# Patient Record
Sex: FEMALE | Race: WHITE | Employment: UNEMPLOYED | URBAN - METROPOLITAN AREA
[De-identification: names, ages, dates, MRNs, and addresses within clinical notes are randomized per-mention and may not be internally consistent; named-entity substitution may affect disease eponyms.]

---
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
## 2025-04-12 SDOH — HEALTH STABILITY: PHYSICAL HEALTH: ON AVERAGE, HOW MANY DAYS PER WEEK DO YOU ENGAGE IN MODERATE TO STRENUOUS EXERCISE (LIKE A BRISK WALK)?: 3 DAYS

## 2025-04-12 SDOH — HEALTH STABILITY: PHYSICAL HEALTH: ON AVERAGE, HOW MANY MINUTES DO YOU ENGAGE IN EXERCISE AT THIS LEVEL?: 30 MIN

## 2025-04-12 ASSESSMENT — SOCIAL DETERMINANTS OF HEALTH (SDOH): HOW OFTEN DO YOU GET TOGETHER WITH FRIENDS OR RELATIVES?: MORE THAN THREE TIMES A WEEK

## 2025-04-17 ENCOUNTER — OFFICE VISIT (OUTPATIENT)
Dept: FAMILY MEDICINE | Facility: CLINIC | Age: 20
End: 2025-04-17
Payer: COMMERCIAL

## 2025-04-17 VITALS
DIASTOLIC BLOOD PRESSURE: 78 MMHG | TEMPERATURE: 98.6 F | SYSTOLIC BLOOD PRESSURE: 120 MMHG | HEART RATE: 95 BPM | RESPIRATION RATE: 20 BRPM | WEIGHT: 190 LBS | BODY MASS INDEX: 29.82 KG/M2 | OXYGEN SATURATION: 100 % | HEIGHT: 67 IN

## 2025-04-17 DIAGNOSIS — Z00.00 ROUTINE GENERAL MEDICAL EXAMINATION AT A HEALTH CARE FACILITY: Primary | ICD-10-CM

## 2025-04-17 DIAGNOSIS — N92.0 MENORRHAGIA WITH REGULAR CYCLE: ICD-10-CM

## 2025-04-17 LAB
ANION GAP SERPL CALCULATED.3IONS-SCNC: 14 MMOL/L (ref 7–15)
BUN SERPL-MCNC: 10 MG/DL (ref 6–20)
CALCIUM SERPL-MCNC: 9.7 MG/DL (ref 8.8–10.4)
CHLORIDE SERPL-SCNC: 102 MMOL/L (ref 98–107)
CLUE CELLS: ABNORMAL
CREAT SERPL-MCNC: 0.9 MG/DL (ref 0.51–0.95)
EGFRCR SERPLBLD CKD-EPI 2021: >90 ML/MIN/1.73M2
ERYTHROCYTE [DISTWIDTH] IN BLOOD BY AUTOMATED COUNT: 13.1 % (ref 10–15)
GLUCOSE SERPL-MCNC: 79 MG/DL (ref 70–99)
HCG UR QL: NEGATIVE
HCO3 SERPL-SCNC: 26 MMOL/L (ref 22–29)
HCT VFR BLD AUTO: 42.8 % (ref 35–47)
HGB BLD-MCNC: 14 G/DL (ref 11.7–15.7)
MCH RBC QN AUTO: 28.2 PG (ref 26.5–33)
MCHC RBC AUTO-ENTMCNC: 32.7 G/DL (ref 31.5–36.5)
MCV RBC AUTO: 86 FL (ref 78–100)
PLATELET # BLD AUTO: 316 10E3/UL (ref 150–450)
POTASSIUM SERPL-SCNC: 4.1 MMOL/L (ref 3.4–5.3)
RBC # BLD AUTO: 4.96 10E6/UL (ref 3.8–5.2)
SODIUM SERPL-SCNC: 142 MMOL/L (ref 135–145)
TRICHOMONAS, WET PREP: ABNORMAL
TSH SERPL DL<=0.005 MIU/L-ACNC: 1.74 UIU/ML (ref 0.5–4.3)
WBC # BLD AUTO: 9.3 10E3/UL (ref 4–11)
WBC'S/HIGH POWER FIELD, WET PREP: ABNORMAL
YEAST, WET PREP: ABNORMAL

## 2025-04-17 PROCEDURE — 1126F AMNT PAIN NOTED NONE PRSNT: CPT | Performed by: NURSE PRACTITIONER

## 2025-04-17 PROCEDURE — 3078F DIAST BP <80 MM HG: CPT | Performed by: NURSE PRACTITIONER

## 2025-04-17 PROCEDURE — 84443 ASSAY THYROID STIM HORMONE: CPT | Performed by: NURSE PRACTITIONER

## 2025-04-17 PROCEDURE — 80048 BASIC METABOLIC PNL TOTAL CA: CPT | Performed by: NURSE PRACTITIONER

## 2025-04-17 PROCEDURE — 85027 COMPLETE CBC AUTOMATED: CPT | Performed by: NURSE PRACTITIONER

## 2025-04-17 PROCEDURE — 3074F SYST BP LT 130 MM HG: CPT | Performed by: NURSE PRACTITIONER

## 2025-04-17 PROCEDURE — 99385 PREV VISIT NEW AGE 18-39: CPT | Performed by: NURSE PRACTITIONER

## 2025-04-17 PROCEDURE — 81025 URINE PREGNANCY TEST: CPT | Performed by: NURSE PRACTITIONER

## 2025-04-17 PROCEDURE — 87210 SMEAR WET MOUNT SALINE/INK: CPT | Performed by: NURSE PRACTITIONER

## 2025-04-17 PROCEDURE — 36415 COLL VENOUS BLD VENIPUNCTURE: CPT | Performed by: NURSE PRACTITIONER

## 2025-04-17 PROCEDURE — 99173 VISUAL ACUITY SCREEN: CPT | Mod: 59 | Performed by: NURSE PRACTITIONER

## 2025-04-17 PROCEDURE — 99213 OFFICE O/P EST LOW 20 MIN: CPT | Mod: 25 | Performed by: NURSE PRACTITIONER

## 2025-04-17 RX ORDER — TRAZODONE HYDROCHLORIDE 50 MG/1
50 TABLET ORAL
COMMUNITY
Start: 2024-05-13

## 2025-04-17 RX ORDER — GABAPENTIN 100 MG/1
CAPSULE ORAL
COMMUNITY
Start: 2024-11-19

## 2025-04-17 RX ORDER — VENLAFAXINE HYDROCHLORIDE 75 MG/1
75 CAPSULE, EXTENDED RELEASE ORAL
COMMUNITY
Start: 2025-02-20

## 2025-04-17 ASSESSMENT — PAIN SCALES - GENERAL: PAINLEVEL_OUTOF10: NO PAIN (0)

## 2025-04-17 NOTE — PROGRESS NOTES
"Preventive Care Visit  [facility]  Fátima Tam, TAYLOR CNP, Family Medicine  Apr 17, 2025      Assessment & Plan   Problem List Items Addressed This Visit    None  Visit Diagnoses       Routine general medical examination at a health care facility    -  Primary    Relevant Orders    CBC with platelets (Completed)    Basic metabolic panel  (Ca, Cl, CO2, Creat, Gluc, K, Na, BUN) (Completed)    TSH with free T4 reflex (Completed)    Menorrhagia with regular cycle        Relevant Orders    HCG Qual, Urine ([id number]) (Completed)    Wet prep - lab collect (Completed)             Patient has been advised of split billing requirements and indicates understanding: Yes       BMI  Estimated body mass index is 30.21 kg/m  as calculated from the following:    Height as of this encounter: 1.689 m (5' 6.5\").    Weight as of this encounter: 86.2 kg (190 lb).   Weight management plan: Discussed healthy diet and exercise guidelines    Counseling  Appropriate preventive services were addressed with this patient via screening, questionnaire, or discussion as appropriate for fall prevention, nutrition, physical activity, Tobacco-use cessation, social engagement, weight loss and cognition.  Checklist reviewing preventive services available has been given to the patient.        Peyman Maciel is a 19 year old, presenting for the following:  Physical        4/17/2025     9:49 AM   Additional Questions   Roomed by Teri        Via the Health Maintenance questionnaire, the patient has reported the following services have been completed -Chlamydia: [facility] 2025-03-11, this information has been sent to the abstraction team.    HPI  Patient presents for annual physical.   2 months late for period  Start date of last menstrual - 2/12  Want to discuss birth control  Mom is on nexplanon - interest in nexplanon  Never been on any birth control  Ok for pregnancy test  Patient had symptoms like this in the past where " she has 1 month without period. This time it has been 2 months.   Denied other associated symptoms.   Menorrhagia during menstruation.     Anxiety - stable with therapist and psych, on gabapentin  Depression - managing by therapist and psych         Advance Care Planning            4/12/2025   General Health   How would you rate your overall physical health? Good   Feel stress (tense, anxious, or unable to sleep) Rather much   (!) STRESS CONCERN      4/12/2025   Nutrition   Three or more servings of calcium each day? Yes   Diet: Regular (no restrictions)   How many servings of fruit and vegetables per day? (!) 2-3   How many sweetened beverages each day? 0-1         4/12/2025   Exercise   Days per week of moderate/strenous exercise 3 days   Average minutes spent exercising at this level 30 min         4/12/2025   Social Factors   Frequency of gathering with friends or relatives More than three times a week   Worry food won't last until get money to buy more No   Food not last or not have enough money for food? No   Do you have housing? (Housing is defined as stable permanent housing and does not include staying ouside in a car, in a tent, in an abandoned building, in an overnight shelter, or couch-surfing.) No   Are you worried about losing your housing? No   Lack of transportation? No   Unable to get utilities (heat,electricity)? No   Want help with housing or utility concern? No   (!) HOUSING CONCERN PRESENT      4/12/2025   Dental   Dentist two times every year? Yes         Today's PHQ-2 Score:       4/17/2025     9:45 AM   PHQ-2 ( 1999 [employer])   Q1: Little interest or pleasure in doing things 1   Q2: Feeling down, depressed or hopeless 1   PHQ-2 Score 2    Q1: Little interest or pleasure in doing things Several days   Q2: Feeling down, depressed or hopeless Several days   PHQ-2 Score 2       Patient-reported           4/12/2025   Substance Use   Alcohol more than 3/day or more than 7/wk No   Do you use any other  substances recreationally? No     Social History     Tobacco Use    Smoking status: Never    Smokeless tobacco: Never   Vaping Use    Vaping status: Every Day    Substances: Nicotine    Devices: Disposable             4/12/2025   One time HIV Screening   Previous HIV test? No         4/12/2025   STI Screening   New sexual partner(s) since last STI/HIV test? (!) YES      History of abnormal Pap smear: No - under age 21, PAP not appropriate for age             4/12/2025   Contraception/Family Planning   Questions about contraception or family planning (!) YES         Reviewed and updated as needed this visit by Provider                    BP Readings from Last 3 Encounters:   04/17/25 120/78   10/13/19 105/65 (44%, Z = -0.15 /  57%, Z = 0.18)*     *BP percentiles are based on the 2017 AAP Clinical Practice Guideline for girls    Wt Readings from Last 3 Encounters:   04/17/25 86.2 kg (190 lb) (96%, Z= 1.78)*     * Growth percentiles are based on [facility] (Girls, 2-20 Years) data.                  There is no problem list on file for this patient.    No past surgical history on file.    Social History     Tobacco Use    Smoking status: Never    Smokeless tobacco: Never   Substance Use Topics    Alcohol use: Not on file     No family history on file.      Current Outpatient Medications   Medication Sig Dispense Refill    gabapentin (NEURONTIN) 100 MG capsule TAKE 1 TO 2 CAPSULES BY MOUTH TWICE DAILY FOR ANXIETY      traZODone (DESYREL) 50 MG tablet Take 50 mg by mouth.      venlafaxine (EFFEXOR XR) 75 MG 24 hr capsule Take 75 mg by mouth.       Allergies   Allergen Reactions    Dairy Aid [Tilactase]     Pseudoephedrine Hives    Milk (Cow) Rash     Recent Labs   Lab Test 10/13/19  1746   ALT 17   CR 0.53   GFRESTIMATED GFR not calculated, patient <18 years old.   GFRESTBLACK GFR not calculated, patient <18 years old.   POTASSIUM 5.3   TSH 1.18          Review of Systems  Constitutional, HEENT, cardiovascular, pulmonary, gi and  "gu systems are negative, except as otherwise noted.     Objective    Exam  /78 (BP Location: Right arm)   Pulse 95   Temp 98.6  F (37  C) (Tympanic)   Resp 20   Ht 1.689 m (5' 6.5\")   Wt 86.2 kg (190 lb)   LMP 01/15/2025   SpO2 100%   BMI 30.21 kg/m     Estimated body mass index is 30.21 kg/m  as calculated from the following:    Height as of this encounter: 1.689 m (5' 6.5\").    Weight as of this encounter: 86.2 kg (190 lb).    Physical Exam  Constitutional:       Appearance: Normal appearance. She is normal weight.   HENT:      Head: Normocephalic.      Right Ear: Tympanic membrane normal.      Left Ear: Tympanic membrane normal.      Nose: Nose normal.      Mouth/Throat:      Mouth: Mucous membranes are moist.      Pharynx: Oropharynx is clear.   Eyes:      Pupils: Pupils are equal, round, and reactive to light.   Cardiovascular:      Rate and Rhythm: Normal rate and regular rhythm.      Pulses: Normal pulses.      Heart sounds: Normal heart sounds.   Pulmonary:      Effort: Pulmonary effort is normal.      Breath sounds: Normal breath sounds.   Abdominal:      General: Abdomen is flat. Bowel sounds are normal.      Palpations: Abdomen is soft.   Musculoskeletal:         General: Normal range of motion.      Cervical back: Normal range of motion.   Skin:     General: Skin is warm and dry.   Neurological:      General: No focal deficit present.      Mental Status: She is alert and oriented to person, place, and time. Mental status is at baseline.   Psychiatric:         Mood and Affect: Mood normal.         Behavior: Behavior normal.         Thought Content: Thought content normal.         Judgment: Judgment normal.           Vision Screen  Vision Screen Details  Does the patient have corrective lenses (glasses/contacts)?: Yes  Vision Acuity Screen  Vision Acuity Tool: Dion  RIGHT EYE: 10/10 (20/20)  LEFT EYE: 10/10 (20/20)    Hearing Screen  Hearing Screen Not Completed  Reason Hearing Screen was " not completed: Parent declined - No concerns        Signed Electronically by: TAYLOR Holley CNP

## 2025-04-17 NOTE — PATIENT INSTRUCTIONS
Patient Education   Preventive Care Advice   This is general advice given by our system to help you stay healthy. However, your care team may have specific advice just for you. Please talk to your care team about your preventive care needs.  Nutrition  Eat 5 or more servings of fruits and vegetables each day.  Try wheat bread, brown rice and whole grain pasta (instead of white bread, rice, and pasta).  Get enough calcium and vitamin D. Check the label on foods and aim for 100% of the RDA (recommended daily allowance).  Lifestyle  Exercise at least 150 minutes each week  (30 minutes a day, 5 days a week).  Do muscle strengthening activities 2 days a week. These help control your weight and prevent disease.  No smoking.  Wear sunscreen to prevent skin cancer.  Have a dental exam and cleaning every 6 months.  Yearly exams  See your health care team every year to talk about:  Any changes in your health.  Any medicines your care team has prescribed.  Preventive care, family planning, and ways to prevent chronic diseases.  Shots (vaccines)   HPV shots (up to age 26), if you've never had them before.  Hepatitis B shots (up to age 59), if you've never had them before.  COVID-19 shot: Get this shot when it's due.  Flu shot: Get a flu shot every year.  Tetanus shot: Get a tetanus shot every 10 years.  Pneumococcal, hepatitis A, and RSV shots: Ask your care team if you need these based on your risk.  Shingles shot (for age 50 and up)  General health tests  Diabetes screening:  Starting at age 35, Get screened for diabetes at least every 3 years.  If you are younger than age 35, ask your care team if you should be screened for diabetes.  Cholesterol test: At age 39, start having a cholesterol test every 5 years, or more often if advised.  Bone density scan (DEXA): At age 50, ask your care team if you should have this scan for osteoporosis (brittle bones).  Hepatitis C: Get tested at least once in your life.  STIs (sexually  transmitted infections)  Before age 24: Ask your care team if you should be screened for STIs.  After age 24: Get screened for STIs if you're at risk. You are at risk for STIs (including HIV) if:  You are sexually active with more than one person.  You don't use condoms every time.  You or a partner was diagnosed with a sexually transmitted infection.  If you are at risk for HIV, ask about PrEP medicine to prevent HIV.  Get tested for HIV at least once in your life, whether you are at risk for HIV or not.  Cancer screening tests  Cervical cancer screening: If you have a cervix, begin getting regular cervical cancer screening tests starting at age 21.  Breast cancer scan (mammogram): If you've ever had breasts, begin having regular mammograms starting at age 40. This is a scan to check for breast cancer.  Colon cancer screening: It is important to start screening for colon cancer at age 45.  Have a colonoscopy test every 10 years (or more often if you're at risk) Or, ask your provider about stool tests like a FIT test every year or Cologuard test every 3 years.  To learn more about your testing options, visit:   .  For help making a decision, visit:   https://bit.ly/cd69961.  Prostate cancer screening test: If you have a prostate, ask your care team if a prostate cancer screening test (PSA) at age 55 is right for you.  Lung cancer screening: If you are a current or former smoker ages 50 to 80, ask your care team if ongoing lung cancer screenings are right for you.  For informational purposes only. Not to replace the advice of your health care provider. Copyright   2023 [facility]. All rights reserved. Clinically reviewed by the [facility]. [employer] 184255 - REV 01/24.  Learning About Stress  What is stress?     Stress is your body's response to a hard situation. Your body can have a physical, emotional, or mental response. Stress is a fact of life for most people, and it  affects everyone differently. What causes stress for you may not be stressful for someone else.  A lot of things can cause stress. You may feel stress when you go on a job interview, take a test, or run a race. This kind of short-term stress is normal and even useful. It can help you if you need to work hard or react quickly. For example, stress can help you finish an important job on time.  Long-term stress is caused by ongoing stressful situations or events. Examples of long-term stress include long-term health problems, ongoing problems at work, or conflicts in your family. Long-term stress can harm your health.  How does stress affect your health?  When you are stressed, your body responds as though you are in danger. It makes hormones that speed up your heart, make you breathe faster, and give you a burst of energy. This is called the fight-or-flight stress response. If the stress is over quickly, your body goes back to normal and no harm is done.  But if stress happens too often or lasts too long, it can have bad effects. Long-term stress can make you more likely to get sick, and it can make symptoms of some diseases worse. If you tense up when you are stressed, you may develop neck, shoulder, or low back pain. Stress is linked to high blood pressure and heart disease.  Stress also harms your emotional health. It can make you pack, tense, or depressed. Your relationships may suffer, and you may not do well at work or school.  What can you do to manage stress?  You can try these things to help manage stress:   Do something active. Exercise or activity can help reduce stress. Walking is a great way to get started. Even everyday activities such as housecleaning or yard work can help.  Try yoga or pamela chi. These techniques combine exercise and meditation. You may need some training at first to learn them.  Do something you enjoy. For example, listen to music or go to a movie. Practice your hobby or do volunteer  "work.  Meditate. This can help you relax, because you are not worrying about what happened before or what may happen in the future.  Do guided imagery. Imagine yourself in any setting that helps you feel calm. You can use online videos, books, or a teacher to guide you.  Do breathing exercises. For example:  From a standing position, bend forward from the waist with your knees slightly bent. Let your arms dangle close to the floor.  Breathe in slowly and deeply as you return to a standing position. Roll up slowly and lift your head last.  Hold your breath for just a few seconds in the standing position.  Breathe out slowly and bend forward from the waist.  Let your feelings out. Talk, laugh, cry, and express anger when you need to. Talking with supportive friends or family, a counselor, or a rosibel leader about your feelings is a healthy way to relieve stress. Avoid discussing your feelings with people who make you feel worse.  Write. It may help to write about things that are bothering you. This helps you find out how much stress you feel and what is causing it. When you know this, you can find better ways to cope.  What can you do to prevent stress?  You might try some of these things to help prevent stress:  Manage your time. This helps you find time to do the things you want and need to do.  Get enough sleep. Your body recovers from the stresses of the day while you are sleeping.  Get support. Your family, friends, and community can make a difference in how you experience stress.  Limit your news feed. Avoid or limit time on social media or news that may make you feel stressed.  Do something active. Exercise or activity can help reduce stress. Walking is a great way to get started.  Where can you learn more?  Go to https://www.[employer].net/patiented  Enter N032 in the search box to learn more about \"Learning About Stress.\"  Current as of: October 24, 2024  Content Version: 14.4 2024-2025 [employer].   Care instructions adapted under license by your healthcare professional. If you have questions about a medical condition or this instruction, always ask your healthcare professional. [employer] disclaims any warranty or liability for your use of this information.    Safer Sex: Care Instructions  Overview  Safer sex is a way to reduce your risk of getting a sexually transmitted infection (STI). It can also help prevent pregnancy.  Several products can help you practice safer sex and reduce your chance of STIs. One of the best is a condom. There are internal and external condoms. You can use a special rubber sheet (dental dam) for protection during oral sex. Disposable gloves can keep your hands from touching blood, semen, or other body fluids that can carry infections.  Remember that birth control methods such as diaphragms, IUDs, foams, and birth control pills do not stop you from getting STIs.  Follow-up care is a key part of your treatment and safety. Be sure to make and go to all appointments, and call your doctor if you are having problems. It's also a good idea to know your test results and keep a list of the medicines you take.  How can you care for yourself at home?  Think about getting vaccinated to help prevent hepatitis A, hepatitis B, and human papillomavirus (HPV). They can be spread through sex.  Use a condom every time you have sex. Use an external condom, which goes on the penis. Or use an internal condom, which goes into the vagina or anus.  Make sure you use the right size external condom. A condom that's too small can break easily. A condom that's too big can slip off during sex.  Use a new condom each time you have sex. Be careful not to poke a hole in the condom when you open the wrapper.  Don't use an internal condom and an external condom at the same time.  Never use petroleum jelly (such as Vaseline), grease, hand lotion, baby oil, or anything with oil in it. These products  "can make holes in the condom.  After intercourse, hold the edge of the condom as you remove it. This will help keep semen from spilling out of the condom.  Do not have sex with anyone who has symptoms of an STI, such as sores on the genitals or mouth.  Do not drink a lot of alcohol or use drugs before sex.  Limit your sex partners. Sex with one partner who has sex only with you can reduce your risk of getting an STI.  Don't share sex toys. But if you do share them, use a condom and clean the sex toys between each use.  Talk to any partners before you have sex. Talk about what you feel comfortable with and whether you have any boundaries with sex. And find out if your partner or partners may be at risk for any STI. Keep in mind that a person may be able to spread an STI even if they do not have symptoms. You and any partners may want to get tested for STIs.  Where can you learn more?  Go to https://www.[employer].net/patiented  Enter B608 in the search box to learn more about \"Safer Sex: Care Instructions.\"  Current as of: April 30, 2024  Content Version: 14.4    7314-8485 [employer].   Care instructions adapted under license by your healthcare professional. If you have questions about a medical condition or this instruction, always ask your healthcare professional. [employer] disclaims any warranty or liability for your use of this information.       "

## 2025-05-06 ENCOUNTER — OFFICE VISIT (OUTPATIENT)
Dept: FAMILY MEDICINE | Facility: CLINIC | Age: 20
End: 2025-05-06
Payer: COMMERCIAL

## 2025-05-06 VITALS
OXYGEN SATURATION: 100 % | BODY MASS INDEX: 29.89 KG/M2 | SYSTOLIC BLOOD PRESSURE: 128 MMHG | HEART RATE: 101 BPM | RESPIRATION RATE: 20 BRPM | DIASTOLIC BLOOD PRESSURE: 78 MMHG | TEMPERATURE: 98.7 F | WEIGHT: 186 LBS | HEIGHT: 66 IN

## 2025-05-06 DIAGNOSIS — Z30.017 ENCOUNTER FOR INITIAL PRESCRIPTION OF IMPLANTABLE SUBDERMAL CONTRACEPTIVE: Primary | ICD-10-CM

## 2025-05-06 DIAGNOSIS — Z30.017 NEXPLANON INSERTION: ICD-10-CM

## 2025-05-06 LAB — HCG UR QL: NEGATIVE

## 2025-05-06 PROCEDURE — 81025 URINE PREGNANCY TEST: CPT | Performed by: NURSE PRACTITIONER

## 2025-05-06 PROCEDURE — 11981 INSERTION DRUG DLVR IMPLANT: CPT | Performed by: NURSE PRACTITIONER

## 2025-05-06 ASSESSMENT — PAIN SCALES - GENERAL: PAINLEVEL_OUTOF10: NO PAIN (0)

## 2025-05-06 NOTE — PROGRESS NOTES
"Nexplanon Insertion:    Is a pregnancy test required: No.  Last pregnancy test for 17 negative has had her period and   Was a consent obtained?  Yes    Subjective: Janell Bradley is a 19 year old No obstetric history on file. presents for Nexplanon.    Patient has been given the opportunity to ask questions about all forms of birth control, including all options appropriate for Janell Bradley. Discussed that no method of birth control, except abstinence is 100% effective against pregnancy or sexually transmitted infection.     Janell Bradley understands she may have the Nexplanon removed at any time and it should be removed by a health care provider.    The entire insertion procedure was reviewed with the patient, including care after placement.    Patient's last menstrual period was 04/16/2025. Last sexual activity: 5/1/2025 . No allergy to betadine or shellfish. Recent STD screening  hCG Urine Qualitative   Date Value Ref Range Status   04/17/2025 Negative Negative Final     Comment:     This test is for screening purposes.  Results should be interpreted along with the clinical picture.  Confirmation testing is available if warranted by ordering [id number], HCG Quantitative Pregnancy.         /78   Pulse 101   Temp 98.7  F (37.1  C) (Tympanic)   Resp 20   Ht 1.676 m (5' 6\")   Wt 84.4 kg (186 lb)   LMP 04/16/2025   SpO2 100%   BMI 30.02 kg/m      PROCEDURE NOTE: -- Nexplanon Insertion    Reason for Insertion: contraception    Patient was placed supine with left arm exposed.  Buffy was made 8-10 cm above medial epicondyle and a guiding buffy 4 cm above the first.  Arm was prepped with Betadine. Insertion point was anesthetized with 2 mL 1% lidocaine. After stretching the skin with thumb and index finger around the insertion site, skin punctured with the tip of the needle inserted at 30 degrees and then lowered to horizontal position. The needle was then advanced to its full length. Applicator was then " stabilized and slider was unlocked. Slider was pulled back until it stopped and then removed.    Correct placement of the implant was confirmed by palpation in the patient's arm and visualizing the purple top of the obturator.   Bandage and pressure dressing applied to insertion site.    EBL: minimal    Complications: none    ASSESSMENT:     ICD-10-CM    1. Screening for HIV (human immunodeficiency virus)  Z11.4       2. Need for hepatitis C screening test  Z11.59              PLAN:    Given 's handouts, including when to have Nexplanon removed, list of danger s/sx, side effects and follow up recommended. Encouraged condom use for prevention of STD. Back up contraception advised for 7 days. Advised to call for any fever, for prolonged or severe pain or bleeding, abnormal vaginal dischage. She was advised to use pain medications (ibuprofen) as needed for mild to moderate pain.     TAYLOR Holley CNP

## 2025-05-14 ENCOUNTER — OFFICE VISIT (OUTPATIENT)
Dept: URGENT CARE | Facility: URGENT CARE | Age: 20
End: 2025-05-14
Payer: COMMERCIAL

## 2025-05-14 VITALS
BODY MASS INDEX: 29.38 KG/M2 | OXYGEN SATURATION: 99 % | WEIGHT: 182 LBS | TEMPERATURE: 99.4 F | SYSTOLIC BLOOD PRESSURE: 121 MMHG | DIASTOLIC BLOOD PRESSURE: 80 MMHG | RESPIRATION RATE: 16 BRPM | HEART RATE: 95 BPM

## 2025-05-14 DIAGNOSIS — R07.0 THROAT PAIN: ICD-10-CM

## 2025-05-14 DIAGNOSIS — B27.99 INFECTIOUS MONONUCLEOSIS, WITH OTHER COMPLICATION, INFECTIOUS MONONUCLEOSIS DUE TO UNSPECIFIED ORGANISM: Primary | ICD-10-CM

## 2025-05-14 DIAGNOSIS — G43.909 MIGRAINE WITHOUT STATUS MIGRAINOSUS, NOT INTRACTABLE, UNSPECIFIED MIGRAINE TYPE: ICD-10-CM

## 2025-05-14 DIAGNOSIS — R22.1 NECK MASS: ICD-10-CM

## 2025-05-14 LAB
BASOPHILS # BLD AUTO: 0 10E3/UL (ref 0–0.2)
BASOPHILS NFR BLD AUTO: 1 %
DEPRECATED S PYO AG THROAT QL EIA: NEGATIVE
EOSINOPHIL # BLD AUTO: 0.1 10E3/UL (ref 0–0.7)
EOSINOPHIL NFR BLD AUTO: 1 %
ERYTHROCYTE [DISTWIDTH] IN BLOOD BY AUTOMATED COUNT: 13.6 % (ref 10–15)
HCT VFR BLD AUTO: 39.7 % (ref 35–47)
HGB BLD-MCNC: 13.5 G/DL (ref 11.7–15.7)
IMM GRANULOCYTES # BLD: 0 10E3/UL
IMM GRANULOCYTES NFR BLD: 1 %
LYMPHOCYTES # BLD AUTO: 1.7 10E3/UL (ref 0.8–5.3)
LYMPHOCYTES NFR BLD AUTO: 26 %
MCH RBC QN AUTO: 29 PG (ref 26.5–33)
MCHC RBC AUTO-ENTMCNC: 34 G/DL (ref 31.5–36.5)
MCV RBC AUTO: 85 FL (ref 78–100)
MONOCYTES # BLD AUTO: 0.6 10E3/UL (ref 0–1.3)
MONOCYTES NFR BLD AUTO: 9 %
MONOCYTES NFR BLD AUTO: POSITIVE %
NEUTROPHILS # BLD AUTO: 4.2 10E3/UL (ref 1.6–8.3)
NEUTROPHILS NFR BLD AUTO: 64 %
NRBC # BLD AUTO: 0 10E3/UL
NRBC BLD AUTO-RTO: 0 /100
PLAT MORPH BLD: ABNORMAL
PLATELET # BLD AUTO: 215 10E3/UL (ref 150–450)
RBC # BLD AUTO: 4.66 10E6/UL (ref 3.8–5.2)
RBC MORPH BLD: ABNORMAL
S PYO DNA THROAT QL NAA+PROBE: NOT DETECTED
VARIANT LYMPHS BLD QL SMEAR: PRESENT
WBC # BLD AUTO: 6.6 10E3/UL (ref 4–11)

## 2025-05-14 PROCEDURE — 3079F DIAST BP 80-89 MM HG: CPT | Performed by: NURSE PRACTITIONER

## 2025-05-14 PROCEDURE — 99215 OFFICE O/P EST HI 40 MIN: CPT | Performed by: NURSE PRACTITIONER

## 2025-05-14 PROCEDURE — 36415 COLL VENOUS BLD VENIPUNCTURE: CPT | Performed by: NURSE PRACTITIONER

## 2025-05-14 PROCEDURE — 85025 COMPLETE CBC W/AUTO DIFF WBC: CPT | Performed by: NURSE PRACTITIONER

## 2025-05-14 PROCEDURE — 86308 HETEROPHILE ANTIBODY SCREEN: CPT | Performed by: NURSE PRACTITIONER

## 2025-05-14 PROCEDURE — 87651 STREP A DNA AMP PROBE: CPT | Performed by: NURSE PRACTITIONER

## 2025-05-14 PROCEDURE — 3074F SYST BP LT 130 MM HG: CPT | Performed by: NURSE PRACTITIONER

## 2025-05-14 RX ORDER — SUMATRIPTAN 50 MG/1
50 TABLET, FILM COATED ORAL
Qty: 6 TABLET | Refills: 0 | Status: SHIPPED | OUTPATIENT
Start: 2025-05-14

## 2025-05-14 NOTE — PROGRESS NOTES
SUBJECTIVE:   Janell Bradley is a 19 year old female presenting with a chief complaint of   Chief Complaint   Patient presents with    Throat Pain     Swollen glands and vomiting (possibly from migraines)   Threw up once yesterday and once today.  Bumps on neck noticed yesterday.  Neck hurts to move, and if pressed on.  Pt tried ibuprofen -helped the migraine but not the neck pain.  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  No cough, no runny nose or stuffy nose.      No past medical history on file.  No family history on file.  Current Outpatient Medications   Medication Sig Dispense Refill    gabapentin (NEURONTIN) 100 MG capsule TAKE 1 TO 2 CAPSULES BY MOUTH TWICE DAILY FOR ANXIETY      traZODone (DESYREL) 50 MG tablet Take 50 mg by mouth.      venlafaxine (EFFEXOR XR) 75 MG 24 hr capsule Take 75 mg by mouth.       Social History     Tobacco Use    Smoking status: Never    Smokeless tobacco: Never   Substance Use Topics    Alcohol use: Not on file       OBJECTIVE  /80   Pulse 95   Temp 99.4  F (37.4  C) (Tympanic)   Resp 16   Wt 82.6 kg (182 lb)   LMP 04/16/2025   SpO2 99%   BMI 29.38 kg/m      Physical Exam  Constitutional:       Appearance: Normal appearance.   HENT:      Right Ear: Tympanic membrane, ear canal and external ear normal.      Left Ear: Tympanic membrane, ear canal and external ear normal.      Nose: Nose normal.      Mouth/Throat:      Mouth: Mucous membranes are moist.      Pharynx: Posterior oropharyngeal erythema present.   Eyes:      Extraocular Movements: Extraocular movements intact.      Conjunctiva/sclera: Conjunctivae normal.   Neck:        Comments: Approx 5cm x 3cm firm mobile mass to right anterior neck. Tender to palpation.   Cardiovascular:      Rate and Rhythm: Normal rate and regular rhythm.      Heart sounds: Normal  heart sounds.   Pulmonary:      Effort: Pulmonary effort is normal.      Breath sounds: Normal breath sounds.   Musculoskeletal:      Cervical back: Tenderness present.   Lymphadenopathy:      Cervical: Cervical adenopathy present.   Skin:     General: Skin is warm and dry.   Neurological:      Mental Status: She is alert.   Psychiatric:         Mood and Affect: Mood normal.     Labs reviewed with pt and dad  Recent Results (from the past week)   Streptococcus A Rapid Screen w/Reflex to PCR - Clinic Collect    Specimen: Throat; Swab   Result Value Ref Range Status    Group A Strep antigen Negative Negative Final   Group A Streptococcus PCR Throat Swab    Specimen: Throat; Swab   Result Value Ref Range Status    Group A strep by PCR Not Detected Not Detected Final   Mononucleosis screen   Result Value Ref Range Status    Mononucleosis Screen Positive (A) Negative Final   CBC with platelets and differential   Result Value Ref Range Status    WBC Count 6.6 4.0 - 11.0 10e3/uL Final    RBC Count 4.66 3.80 - 5.20 10e6/uL Final    Hemoglobin 13.5 11.7 - 15.7 g/dL Final    Hematocrit 39.7 35.0 - 47.0 % Final    MCV 85 78 - 100 fL Final    MCH 29.0 26.5 - 33.0 pg Final    MCHC 34.0 31.5 - 36.5 g/dL Final    RDW 13.6 10.0 - 15.0 % Final    Platelet Count 215 150 - 450 10e3/uL Final    % Neutrophils 64 % Final    % Lymphocytes 26 % Final    % Monocytes 9 % Final    % Eosinophils 1 % Final    % Basophils 1 % Final    % Immature Granulocytes 1 % Final    NRBCs per 100 WBC 0 <1 /100 Final    Absolute Neutrophils 4.2 1.6 - 8.3 10e3/uL Final    Absolute Lymphocytes 1.7 0.8 - 5.3 10e3/uL Final    Absolute Monocytes 0.6 0.0 - 1.3 10e3/uL Final    Absolute Eosinophils 0.1 0.0 - 0.7 10e3/uL Final    Absolute Basophils 0.0 0.0 - 0.2 10e3/uL Final    Absolute Immature Granulocytes 0.0 <=0.4 10e3/uL Final    Absolute NRBCs 0.0 10e3/uL Final     *Note: Due to a large number of results and/or encounters for the requested time period, some  results have not been displayed. A complete set of results can be found in Results Review.     I consulted with Dr Schwab on the mass on pt neck. He assessed the pt as well. I suspect this is a very large lymph node but would need imaging to confirm this. It is not uncommon with dx of mono but somewhat odd that it is only the one side. Pt should have CT scan to rule out other causes for the mass.  Pt will go to [facility] tomorrow at 9am for further evaluation.    ASSESSMENT:    1. Infectious mononucleosis, with other complication, infectious mononucleosis due to unspecified organism (Primary)      2. Neck mass      3. Throat pain    - Streptococcus A Rapid Screen w/Reflex to PCR - Clinic Collect  - Group A Streptococcus PCR Throat Swab  - CBC with platelets and differential; Future  - Mononucleosis screen; Future  - CBC with platelets and differential  - Mononucleosis screen    4. Migraine without status migrainosus, not intractable, unspecified migraine type    - SUMAtriptan (IMITREX) 50 MG tablet; Take 1 tablet (50 mg) by mouth at onset of headache for migraine. May repeat in 2 hours. Max 4 tablets/24 hours.  Dispense: 6 tablet; Refill: 0      PLAN:  Referral to Acute and Diagnostic Services    [phone] ([place]) [address] [address]    Transition to Acute & Diagnostic Services Clinic has been discussed with patient, and she agrees with next level of care.   Patient understands that evaluation/treatment at [facility] typically takes significantly longer than in clinic/urgent care (>2 hours).  The [facility] has been contacted by provider/staff to confirm patient acceptance.         Special issues:      None                   The following provider has assessed this patient for intervention at [facility], and directed the patient for referral: Silke Hollins NP

## 2025-05-14 NOTE — PATIENT INSTRUCTIONS
Things to Know About Infectious Mononucleosis  What is infectious mononucleosis?  Infectious mononucleosis (say: in-feck-shuss mon-oh-new-gvdk-rq-ndgq), or mono, often is caused by the Dez-Barr virus. Mono usually is not serious, but some people with mono feel very tired and have pain in their joints that lasts for several weeks.  Who gets mono and why?  Mono is most common in older children, teenagers, and young adults. The virus is spread by contact with the saliva of someone who had the infection within the past few months. Mono can be spread by kissing a person who is infected or by sharing a glass, bottle, or eating utensils.  Most people get mono by the time they are adults. But most people have a very mild infection that might be mistaken for the flu.  How can my doctor tell I have mono?  People with mono usually have a sore throat, fever, swollen glands, and pus on their tonsils. Their liver and spleen might be tender and larger than normal. Your doctor might want you to have blood tests. He or she might want to test you for other sicknesses that are like mono.  How is mono treated?  The most important thing you can do when you have mono is get plenty of rest and drink enough liquids. You may want to take a pain reliever such as acetaminophen (one brand name: Tylenol) or ibuprofen (some brand names: Advil, Motrin, Nuprin). Do not give aspirin to children with mono. If your throat is very sore or if your tonsils are swollen, your doctor might prescribe medicines called corticosteroids.  Because mono is caused by a virus, antibiotics would not help you get better. But some people with mono get strep throat at the same time. Antibiotics will help strep throat go away.  What can I expect?  Most people with mono feel better after one month. Some people feel tired and sleep more than normal for as long as six months.  If you have a job, it is a good idea to let your human resources department know that you are  sick with mono. If you are not able to work for a long time, you might be able to take a medical leave of absence.  If you are a student, talk to your guidance counselor. He or she will help you decide what to do if you will be out of school for a long time.  Is mono ever dangerous?  Sometimes mono can cause serious problems. One problem is with the spleen. The spleen is an organ in the upper part of your abdomen on the left side. In people with mono, sometimes the spleen grows very large and tears open. This is called a rupture. This happens to only about one in 1,000 people with mono. About one half of these ruptures happen during contact sports, such as football. If you get mono, you should not play sports for at least four weeks. Your doctor might want you to have an ultrasound test before you go back to sports.  Mono also can affect your liver. If you have mono, you should not drink alcohol while you are sick. If you notice a yellow color to your skin or if you begin to bruise easily, see your doctor.  How can I prevent mono?  The best way to keep from getting mono is to avoid contact with the saliva of infected people. Do not share bottles, cans, glasses, plates, or eating utensils. Do not kiss a person who had mono recently.

## 2025-05-15 ENCOUNTER — OFFICE VISIT (OUTPATIENT)
Dept: PEDIATRICS | Facility: CLINIC | Age: 20
End: 2025-05-15
Payer: COMMERCIAL

## 2025-05-15 ENCOUNTER — RESULTS FOLLOW-UP (OUTPATIENT)
Dept: PEDIATRICS | Facility: CLINIC | Age: 20
End: 2025-05-15

## 2025-05-15 VITALS
BODY MASS INDEX: 29.25 KG/M2 | TEMPERATURE: 98.3 F | RESPIRATION RATE: 16 BRPM | DIASTOLIC BLOOD PRESSURE: 79 MMHG | SYSTOLIC BLOOD PRESSURE: 113 MMHG | WEIGHT: 182 LBS | HEART RATE: 101 BPM | OXYGEN SATURATION: 97 % | HEIGHT: 66 IN

## 2025-05-15 DIAGNOSIS — B27.99 INFECTIOUS MONONUCLEOSIS, WITH OTHER COMPLICATION, INFECTIOUS MONONUCLEOSIS DUE TO UNSPECIFIED ORGANISM: ICD-10-CM

## 2025-05-15 DIAGNOSIS — R22.1 NECK MASS: Primary | ICD-10-CM

## 2025-05-15 ASSESSMENT — PAIN SCALES - GENERAL: PAINLEVEL_OUTOF10: MODERATE PAIN (6)

## 2025-05-15 NOTE — LETTER
May 15, 2025      Janell Bradley  [address] [facility] [address]        To Whom It May Concern:    Janell Bradley was seen on 5/14/2025 and 5/15/2025.  Please excuse her from 5/14/2025 until 5/18/2025 due to illness.        Sincerely,        Jud Decker DNP    Electronically signed

## 2025-05-15 NOTE — PATIENT INSTRUCTIONS
CT scan negative for any abscess or infection, multiple enlarged lymph nodes as expected with mono    Can apply ice and/or heat to area, continue ibuprofen and Tylenol alternating and drink plenty of fluids    If swallowing symptoms or throat pain worsens, please follow-up with primary care provider or urgent care    If neck swelling not improving, would recommend following up with ENT, referral placed.

## 2025-05-15 NOTE — PROGRESS NOTES
Acute [facility] Diagnostic Services Clinic Visit    Assessment & Plan     Neck mass  Infectious mononucleosis, with other complication, infectious mononucleosis due to unspecified organism  Patient presents to [facility] today after evaluation in urgent care yesterday for neck pain, headache, nausea vomiting and throat pain.  Tested positive for mono.  Was found to have a large, tender right neck mass.  Suspected to be a large lymph node given Mono positive, however given unilateral and size, further evaluation advised.  Patient reports mass is similar in size from yesterday, not associated with any warmth, erythema or surrounding swelling.  Throat symptoms the same with erythema, worse on the right.  Able to swallow okay, eating mashed potatoes here during visit.  Patient is currently afebrile, however has been alternating Tylenol and ibuprofen.  Neck CT obtained to further evaluate mass, showing enlarged adenoid tonsil and palate teen tonsil and multiple enlarged lymph nodes, the largest at the right level llb, most consistent with patient swelling.  No abscess noted and mild soft tissue edema in the right submandibular region.  Discussed results with patient and significant other.  Recommend continued supportive cares including rest, hydration, alternating Tylenol and ibuprofen and applying heat and/or ice to neck area.  Advised if symptoms worsen to return to urgent care or contact primary care provider.  - CT Soft Tissue Neck w Contrast; Future  - sodium chloride (PF) 0.9% PF flush 3 mL  - Adult ENT  Referral; Future    40 minutes were spent doing chart review, history and exam, documentation and further activities per the note.           Peyman Maciel is a 19 year old, presenting for the following health issues:  Throat Pain (Mass right side of neck/throat causing pain, currently positive for mononucleosis. Throat pain was worse last night and had trouble breathing because of the pain. Seemed to have gotten  "bigger than earlier in the day. This morning it is the same size as it was yesterday in . /)    HPI    Chief Complaint   Patient presents with    Throat Pain     Mass right side of neck/throat causing pain, currently positive for mononucleosis. Throat pain was worse last night and had trouble breathing because of the pain. Seemed to have gotten bigger than earlier in the day. This morning it is the same size as it was yesterday in .          Mass on neck is back down to where it was prior to going into urgent care, was larger after poking/prodding.   Taking Tylenol and ibuprofen around the clock since last evening  Drinking a lot of fluids  Difficulty swallowing still  Hasn't been eating           Review of Systems  Constitutional, HEENT, cardiovascular, pulmonary, gi and gu systems are negative, except as otherwise noted.      Objective    /79 (BP Location: Left arm, Patient Position: Sitting, Cuff Size: Adult Regular)   Pulse 101   Temp 98.3  F (36.8  C) (Tympanic)   Resp 16   Ht 1.676 m (5' 6\")   Wt 82.6 kg (182 lb)   LMP 04/16/2025   SpO2 97%   BMI 29.38 kg/m    Body mass index is 29.38 kg/m .  Physical Exam   GENERAL: alert, no distress, and fatigued  HENT: normal cephalic/atraumatic, ear canals and TM's normal, oral mucous membranes moist, and posterior oropharynx erythema R>L  NECK: approximate 5cm x 3cm firm, mobile tender mass on right anterior neck without surrounding erythema, swelling or warmth   RESP: lungs clear to auscultation - no rales, rhonchi or wheezes  CV: regular rate and rhythm, normal S1 S2, no S3 or S4, no murmur, click or rub, no peripheral edema  ABDOMEN: soft, nontender, no hepatosplenomegaly, no masses and bowel sounds normal  MS: no gross musculoskeletal defects noted, no edema  SKIN: no suspicious lesions or rashes  NEURO: Normal strength and tone, mentation intact and speech normal  PSYCH: mentation appears normal, affect normal/bright    Diagnostic Test " Results:  Labs reviewed in Epic  CT soft tissue neck ~  IMPRESSION:   1.  Enlarged adenoid tonsil and bilateral palatine tonsils. Multiple enhancing bilateral cervical and parapharyngeal lymph nodes. Some of them are enlarged and some of them are borderline enlarged. The largest is in the right level IIb and measures 1.9   cm. These are favored to be secondary to infectious mononucleosis.  2.  Mild subcutaneous soft tissue edema in the right submandibular region.         Signed Electronically by: Jud Decker DNP      Chart documentation with Dragon Voice recognition Software. Although reviewed after completion, some words and grammatical errors may remain.

## 2025-05-19 ENCOUNTER — PATIENT OUTREACH (OUTPATIENT)
Dept: CARE COORDINATION | Facility: CLINIC | Age: 20
End: 2025-05-19
Payer: COMMERCIAL

## 2025-05-19 ENCOUNTER — TELEPHONE (OUTPATIENT)
Dept: FAMILY MEDICINE | Facility: CLINIC | Age: 20
End: 2025-05-19
Payer: COMMERCIAL

## 2025-05-19 ENCOUNTER — HOSPITAL ENCOUNTER (EMERGENCY)
Facility: CLINIC | Age: 20
Discharge: HOME | End: 2025-05-19
Attending: FAMILY MEDICINE | Admitting: FAMILY MEDICINE
Payer: COMMERCIAL

## 2025-05-19 VITALS
HEART RATE: 94 BPM | RESPIRATION RATE: 20 BRPM | BODY MASS INDEX: 28.56 KG/M2 | DIASTOLIC BLOOD PRESSURE: 79 MMHG | SYSTOLIC BLOOD PRESSURE: 116 MMHG | OXYGEN SATURATION: 98 % | TEMPERATURE: 97.8 F | WEIGHT: 182 LBS | HEIGHT: 67 IN

## 2025-05-19 DIAGNOSIS — R59.0 CERVICAL LYMPHADENOPATHY: ICD-10-CM

## 2025-05-19 DIAGNOSIS — B27.90 MONONUCLEOSIS SYNDROME: ICD-10-CM

## 2025-05-19 PROCEDURE — 99283 EMERGENCY DEPT VISIT LOW MDM: CPT | Performed by: FAMILY MEDICINE

## 2025-05-19 PROCEDURE — 250N000012 HC RX MED GY IP 250 OP 636 PS 637: Performed by: FAMILY MEDICINE

## 2025-05-19 RX ADMIN — DEXAMETHASONE 10 MG: 2 TABLET ORAL at 11:32

## 2025-05-19 ASSESSMENT — ACTIVITIES OF DAILY LIVING (ADL): ADLS_ACUITY_SCORE: 41

## 2025-05-19 NOTE — ED PROVIDER NOTES
"  History     Chief Complaint   Patient presents with    Pharyngitis     HPI  Janell Bradley is a 19 year old female who presents with a history of recently diagnosed mononucleosis with prior CT in the last week demonstrating only adenopathy and no abscess.  She returns here for larger left neck swelling is more localized and tender and no associated shortness of breath wheezing stridor there is no nausea vomiting she is tolerating food fluids.  She has typical fatigue and sleeping more per day related to her mono.        Allergies:  Allergies   Allergen Reactions    Other Drug Allergy (See Comments) Anaphylaxis     hay    Dairy Aid [Tilactase]     Pseudoephedrine Hives    Milk (Cow) Rash       Problem List:    There are no active problems to display for this patient.       Past Medical History:    No past medical history on file.    Past Surgical History:    No past surgical history on file.    Family History:    No family history on file.    Social History:  Marital Status:  Single [1]  Social History     Tobacco Use    Smoking status: Never    Smokeless tobacco: Never   Vaping Use    Vaping status: Every Day    Substances: Nicotine    Devices: Disposable        Medications:    gabapentin (NEURONTIN) 100 MG capsule  SUMAtriptan (IMITREX) 50 MG tablet  traZODone (DESYREL) 50 MG tablet  venlafaxine (EFFEXOR XR) 75 MG 24 hr capsule          Review of Systems  ROS:  5 point ROS negative except as noted above in HPI, including Gen., Resp., CV, GI &  system review.      Physical Exam   BP: 116/79  Pulse: 94  Temp: 97.8  F (36.6  C)  Resp: 20  Height: 170.2 cm (5' 7\")  Weight: 82.6 kg (182 lb)  SpO2: 99 %      Physical Exam  Constitutional:       General: She is in acute distress.      Appearance: She is not diaphoretic.   Eyes:      Conjunctiva/sclera: Conjunctivae normal.   Cardiovascular:      Rate and Rhythm: Normal rate and regular rhythm.      Heart sounds: No murmur heard.  Pulmonary:      Effort: Pulmonary " effort is normal. No respiratory distress.      Breath sounds: Normal breath sounds. No stridor. No wheezing or rhonchi.   Musculoskeletal:      Cervical back: Neck supple.   Lymphadenopathy:      Cervical: Cervical adenopathy present.   Skin:     Coloration: Skin is not pale.      Findings: No rash.   Neurological:      Mental Status: She is alert.           There is large tender adenopathy cervical particularly on the right side.  This appears to be a very large single node.  No signs of abscess airway is clear there is no stridor or wheezing.  Neck is supple.  She is nontoxic in appearance and afebrile    ED Course        Procedures              Critical Care time:  none     None         No results found for this or any previous visit (from the past 24 hours).    Medications   dexAMETHasone (DECADRON) tablet 10 mg (has no administration in time range)       Assessments & Plan (with Medical Decision Making)     MDM;Janell Bradley is a 19 year old female presents with a mononucleosis history in the last week with CT imaging earlier demonstrating adenopathy and she now has significant cervical adenopathy on the left side that is isolated firm and more consistent with a cervical lymph node that is with mononucleosis.  She has no airway compromise no signs of abscess no toxicity and I would recommend Decadron today 10 mg orally and discharged home note for work and follow-up with primary provider.  Precautions are given for return.  Discussed maintaining hydration  I have reviewed the nursing notes.    I have reviewed the findings, diagnosis, plan and need for follow up with the patient.           Medical Decision Making  The patient's presentation was of moderate complexity (a chronic illness mild to moderate exacerbation, progression, or side effect of treatment).    The patient's evaluation involved:  history and exam without other MDM data elements    The patient's management necessitated moderate risk  (prescription drug management including medications given in the ED).        New Prescriptions    No medications on file       Final diagnoses:   Mononucleosis syndrome   Cervical lymphadenopathy - large right node consiostent with mono.  no signs of abscess. reassuring recent CT.  plan decadron here - which will work for 60 hours.  continue home hydration. tylenol and motrin as needed.         5/19/2025   [facility] EMERGENCY DEPT       Orlando Magaña MD  05/19/25 1685

## 2025-05-19 NOTE — DISCHARGE INSTRUCTIONS
ICD-10-CM    1. Mononucleosis syndrome  B27.90       2. Cervical lymphadenopathy  R59.0     large right node consiostent with mono.  no signs of abscess. reassuring recent CT.  plan decadron here - which will work for 60 hours.  continue home hydration. tylenol and motrin as needed.

## 2025-05-19 NOTE — TELEPHONE ENCOUNTER
Patient was seen in [facility] on 5/15/25 for a neck mass and mononucleosis was given ENT referral she is unable to get in until 6/26/25.    Patient states the swelling has increased, is having difficulty swallowing her saliva and she is having a harder time to breath.     Advised patient she needs to be evaluated in the ED ASAP, she agreed with this plan, [place] and [place] to [place] ED, advised if she is noticing her breathing is becoming labored she needs to call 911.     Update sent to Norma Decker DNP as FYI.    Julie Behrendt RN

## 2025-05-19 NOTE — ED TRIAGE NOTES
"Pt presents to ED with c/o swollen/sore throat. Pt dx with Coke on Wednesday, seen on Thursday and had CT scan d/t increased swelling in throat. Pt states swelling/pain to bilateral throat/neck. Pt states \"feels like rock in back of my throat\". Patient states pain worse with breathing. Afebrile.      Triage Assessment (Adult)       Row Name 05/19/25 1022          Triage Assessment    Airway WDL WDL        Respiratory WDL    Respiratory WDL WDL        Skin Circulation/Temperature WDL    Skin Circulation/Temperature WDL WDL        Cardiac WDL    Cardiac WDL WDL        Cognitive/Neuro/Behavioral WDL    Cognitive/Neuro/Behavioral WDL WDL                     "

## 2025-05-19 NOTE — Clinical Note
Janell Bradley was seen and treated in our emergency department on 5/19/2025.  She may return to work on 05/24/2025.       If you have any questions or concerns, please don't hesitate to call.      Torres Tejada, RN

## 2025-05-19 NOTE — TELEPHONE ENCOUNTER
FYI - Status Update    Who is Calling: patient    Update: Pt seen in clinic 5/15. Pt states she has scheduled an ENT appt for the soonest available 6/26. Pt states she woke up this AM 5/19 with increased neck swelling and states it's harder to breathe. Pt feels frustrated and does not know how to proceed. Please advise.     Does caller want a call/response back: Yes     Could we send this information to you in [employer] or would you prefer to receive a phone call?:   Patient would prefer a phone call   Okay to leave a detailed message?: Yes at Home number on file [phone] (home)

## 2025-05-21 ENCOUNTER — MYC MEDICAL ADVICE (OUTPATIENT)
Dept: FAMILY MEDICINE | Facility: CLINIC | Age: 20
End: 2025-05-21
Payer: COMMERCIAL

## 2025-05-22 ENCOUNTER — OFFICE VISIT (OUTPATIENT)
Dept: FAMILY MEDICINE | Facility: CLINIC | Age: 20
End: 2025-05-22
Payer: COMMERCIAL

## 2025-05-22 ENCOUNTER — RESULTS FOLLOW-UP (OUTPATIENT)
Dept: FAMILY MEDICINE | Facility: CLINIC | Age: 20
End: 2025-05-22

## 2025-05-22 ENCOUNTER — HOSPITAL ENCOUNTER (EMERGENCY)
Facility: CLINIC | Age: 20
Discharge: HOME | End: 2025-05-22
Attending: EMERGENCY MEDICINE | Admitting: EMERGENCY MEDICINE
Payer: COMMERCIAL

## 2025-05-22 ENCOUNTER — APPOINTMENT (OUTPATIENT)
Dept: ULTRASOUND IMAGING | Facility: CLINIC | Age: 20
End: 2025-05-22
Attending: EMERGENCY MEDICINE
Payer: COMMERCIAL

## 2025-05-22 ENCOUNTER — TELEPHONE (OUTPATIENT)
Dept: FAMILY MEDICINE | Facility: CLINIC | Age: 20
End: 2025-05-22

## 2025-05-22 VITALS
OXYGEN SATURATION: 99 % | DIASTOLIC BLOOD PRESSURE: 68 MMHG | WEIGHT: 183 LBS | HEART RATE: 87 BPM | TEMPERATURE: 97.8 F | SYSTOLIC BLOOD PRESSURE: 106 MMHG | BODY MASS INDEX: 28.72 KG/M2 | RESPIRATION RATE: 16 BRPM | HEIGHT: 67 IN

## 2025-05-22 VITALS
HEART RATE: 81 BPM | BODY MASS INDEX: 29.41 KG/M2 | SYSTOLIC BLOOD PRESSURE: 109 MMHG | TEMPERATURE: 98.3 F | OXYGEN SATURATION: 99 % | RESPIRATION RATE: 16 BRPM | DIASTOLIC BLOOD PRESSURE: 67 MMHG | HEIGHT: 66 IN | WEIGHT: 183 LBS

## 2025-05-22 DIAGNOSIS — B27.99 INFECTIOUS MONONUCLEOSIS, WITH OTHER COMPLICATION, INFECTIOUS MONONUCLEOSIS DUE TO UNSPECIFIED ORGANISM: Primary | ICD-10-CM

## 2025-05-22 DIAGNOSIS — R74.8 ELEVATED LIVER ENZYMES: ICD-10-CM

## 2025-05-22 DIAGNOSIS — A49.01 STAPH AUREUS INFECTION: ICD-10-CM

## 2025-05-22 DIAGNOSIS — I88.9 CERVICAL LYMPHADENITIS: ICD-10-CM

## 2025-05-22 LAB
ALBUMIN SERPL BCG-MCNC: 4.1 G/DL (ref 3.5–5.2)
ALBUMIN SERPL BCG-MCNC: 4.2 G/DL (ref 3.5–5.2)
ALBUMIN UR-MCNC: NEGATIVE MG/DL
ALP SERPL-CCNC: 313 U/L (ref 40–150)
ALP SERPL-CCNC: 322 U/L (ref 40–150)
ALT SERPL W P-5'-P-CCNC: 583 U/L (ref 0–50)
ALT SERPL W P-5'-P-CCNC: 586 U/L (ref 0–50)
ANION GAP SERPL CALCULATED.3IONS-SCNC: 13 MMOL/L (ref 7–15)
ANION GAP SERPL CALCULATED.3IONS-SCNC: 14 MMOL/L (ref 7–15)
APAP SERPL-MCNC: <5 UG/ML (ref 10–30)
APPEARANCE UR: CLEAR
AST SERPL W P-5'-P-CCNC: 255 U/L (ref 0–35)
AST SERPL W P-5'-P-CCNC: 256 U/L (ref 0–35)
BASOPHILS # BLD AUTO: 0 10E3/UL (ref 0–0.2)
BASOPHILS NFR BLD AUTO: 0 %
BILIRUB SERPL-MCNC: 0.6 MG/DL
BILIRUB SERPL-MCNC: 0.6 MG/DL
BILIRUB UR QL STRIP: NEGATIVE
BUN SERPL-MCNC: 12.4 MG/DL (ref 6–20)
BUN SERPL-MCNC: 13.3 MG/DL (ref 6–20)
CALCIUM SERPL-MCNC: 9.4 MG/DL (ref 8.8–10.4)
CALCIUM SERPL-MCNC: 9.4 MG/DL (ref 8.8–10.4)
CHLORIDE SERPL-SCNC: 102 MMOL/L (ref 98–107)
CHLORIDE SERPL-SCNC: 104 MMOL/L (ref 98–107)
COLOR UR AUTO: ABNORMAL
CREAT SERPL-MCNC: 0.9 MG/DL (ref 0.51–0.95)
CREAT SERPL-MCNC: 1 MG/DL (ref 0.51–0.95)
EGFRCR SERPLBLD CKD-EPI 2021: 83 ML/MIN/1.73M2
EGFRCR SERPLBLD CKD-EPI 2021: >90 ML/MIN/1.73M2
EOSINOPHIL # BLD AUTO: 0.1 10E3/UL (ref 0–0.7)
EOSINOPHIL NFR BLD AUTO: 1 %
ERYTHROCYTE [DISTWIDTH] IN BLOOD BY AUTOMATED COUNT: 14.6 % (ref 10–15)
ERYTHROCYTE [DISTWIDTH] IN BLOOD BY AUTOMATED COUNT: 14.8 % (ref 10–15)
GLUCOSE SERPL-MCNC: 86 MG/DL (ref 70–99)
GLUCOSE SERPL-MCNC: 92 MG/DL (ref 70–99)
GLUCOSE UR STRIP-MCNC: NEGATIVE MG/DL
HAV AB SER QL IA: REACTIVE
HCG UR QL: NEGATIVE
HCO3 SERPL-SCNC: 23 MMOL/L (ref 22–29)
HCO3 SERPL-SCNC: 25 MMOL/L (ref 22–29)
HCT VFR BLD AUTO: 39.4 % (ref 35–47)
HCT VFR BLD AUTO: 40.6 % (ref 35–47)
HGB BLD-MCNC: 12.8 G/DL (ref 11.7–15.7)
HGB BLD-MCNC: 13.1 G/DL (ref 11.7–15.7)
HGB UR QL STRIP: NEGATIVE
IMM GRANULOCYTES # BLD: 0.1 10E3/UL
IMM GRANULOCYTES NFR BLD: 1 %
INR PPP: 1 (ref 0.85–1.15)
KETONES UR STRIP-MCNC: NEGATIVE MG/DL
LEUKOCYTE ESTERASE UR QL STRIP: NEGATIVE
LYMPHOCYTES # BLD AUTO: 7.9 10E3/UL (ref 0.8–5.3)
LYMPHOCYTES NFR BLD AUTO: 66 %
MCH RBC QN AUTO: 27.9 PG (ref 26.5–33)
MCH RBC QN AUTO: 28 PG (ref 26.5–33)
MCHC RBC AUTO-ENTMCNC: 32.3 G/DL (ref 31.5–36.5)
MCHC RBC AUTO-ENTMCNC: 32.5 G/DL (ref 31.5–36.5)
MCV RBC AUTO: 86 FL (ref 78–100)
MCV RBC AUTO: 87 FL (ref 78–100)
MONOCYTES # BLD AUTO: 0.8 10E3/UL (ref 0–1.3)
MONOCYTES NFR BLD AUTO: 7 %
NEUTROPHILS # BLD AUTO: 3 10E3/UL (ref 1.6–8.3)
NEUTROPHILS NFR BLD AUTO: 25 %
NITRATE UR QL: NEGATIVE
NRBC # BLD AUTO: 0 10E3/UL
NRBC BLD AUTO-RTO: 0 /100
PH UR STRIP: 6.5 [PH] (ref 5–7)
PLAT MORPH BLD: NORMAL
PLATELET # BLD AUTO: 264 10E3/UL (ref 150–450)
PLATELET # BLD AUTO: 280 10E3/UL (ref 150–450)
POTASSIUM SERPL-SCNC: 3.9 MMOL/L (ref 3.4–5.3)
POTASSIUM SERPL-SCNC: 4.4 MMOL/L (ref 3.4–5.3)
PROT SERPL-MCNC: 7.5 G/DL (ref 6.4–8.3)
PROT SERPL-MCNC: 7.5 G/DL (ref 6.4–8.3)
PROTHROMBIN TIME: 13.1 SECONDS (ref 11.8–14.8)
RBC # BLD AUTO: 4.57 10E6/UL (ref 3.8–5.2)
RBC # BLD AUTO: 4.69 10E6/UL (ref 3.8–5.2)
RBC MORPH BLD: NORMAL
RBC URINE: <1 /HPF
SODIUM SERPL-SCNC: 140 MMOL/L (ref 135–145)
SODIUM SERPL-SCNC: 141 MMOL/L (ref 135–145)
SP GR UR STRIP: 1.02 (ref 1–1.03)
SQUAMOUS EPITHELIAL: 4 /HPF
UROBILINOGEN UR STRIP-MCNC: NORMAL MG/DL
WBC # BLD AUTO: 11.4 10E3/UL (ref 4–11)
WBC # BLD AUTO: 11.9 10E3/UL (ref 4–11)
WBC URINE: 1 /HPF

## 2025-05-22 PROCEDURE — 3074F SYST BP LT 130 MM HG: CPT | Performed by: NURSE PRACTITIONER

## 2025-05-22 PROCEDURE — 86705 HEP B CORE ANTIBODY IGM: CPT | Performed by: EMERGENCY MEDICINE

## 2025-05-22 PROCEDURE — 93975 VASCULAR STUDY: CPT

## 2025-05-22 PROCEDURE — 36415 COLL VENOUS BLD VENIPUNCTURE: CPT | Performed by: EMERGENCY MEDICINE

## 2025-05-22 PROCEDURE — 99284 EMERGENCY DEPT VISIT MOD MDM: CPT | Performed by: EMERGENCY MEDICINE

## 2025-05-22 PROCEDURE — 85025 COMPLETE CBC W/AUTO DIFF WBC: CPT | Performed by: NURSE PRACTITIONER

## 2025-05-22 PROCEDURE — 76705 ECHO EXAM OF ABDOMEN: CPT

## 2025-05-22 PROCEDURE — 80143 DRUG ASSAY ACETAMINOPHEN: CPT | Performed by: EMERGENCY MEDICINE

## 2025-05-22 PROCEDURE — 86706 HEP B SURFACE ANTIBODY: CPT | Performed by: EMERGENCY MEDICINE

## 2025-05-22 PROCEDURE — 80053 COMPREHEN METABOLIC PANEL: CPT | Performed by: NURSE PRACTITIONER

## 2025-05-22 PROCEDURE — 1125F AMNT PAIN NOTED PAIN PRSNT: CPT | Performed by: NURSE PRACTITIONER

## 2025-05-22 PROCEDURE — 81025 URINE PREGNANCY TEST: CPT | Performed by: EMERGENCY MEDICINE

## 2025-05-22 PROCEDURE — 86708 HEPATITIS A ANTIBODY: CPT | Performed by: NURSE PRACTITIONER

## 2025-05-22 PROCEDURE — 82947 ASSAY GLUCOSE BLOOD QUANT: CPT | Performed by: EMERGENCY MEDICINE

## 2025-05-22 PROCEDURE — 99284 EMERGENCY DEPT VISIT MOD MDM: CPT | Mod: 25 | Performed by: EMERGENCY MEDICINE

## 2025-05-22 PROCEDURE — 99214 OFFICE O/P EST MOD 30 MIN: CPT | Performed by: NURSE PRACTITIONER

## 2025-05-22 PROCEDURE — 81003 URINALYSIS AUTO W/O SCOPE: CPT | Performed by: EMERGENCY MEDICINE

## 2025-05-22 PROCEDURE — 36415 COLL VENOUS BLD VENIPUNCTURE: CPT | Performed by: NURSE PRACTITIONER

## 2025-05-22 PROCEDURE — 85610 PROTHROMBIN TIME: CPT | Performed by: EMERGENCY MEDICINE

## 2025-05-22 PROCEDURE — 87340 HEPATITIS B SURFACE AG IA: CPT | Performed by: EMERGENCY MEDICINE

## 2025-05-22 PROCEDURE — 85014 HEMATOCRIT: CPT | Performed by: EMERGENCY MEDICINE

## 2025-05-22 PROCEDURE — 3078F DIAST BP <80 MM HG: CPT | Performed by: NURSE PRACTITIONER

## 2025-05-22 RX ORDER — CEPHALEXIN 500 MG/1
500 CAPSULE ORAL 2 TIMES DAILY
Qty: 14 CAPSULE | Refills: 0 | Status: SHIPPED | OUTPATIENT
Start: 2025-05-22 | End: 2025-05-29

## 2025-05-22 RX ORDER — PREDNISONE 20 MG/1
TABLET ORAL
Qty: 14 TABLET | Refills: 0 | Status: SHIPPED | OUTPATIENT
Start: 2025-05-22

## 2025-05-22 ASSESSMENT — COLUMBIA-SUICIDE SEVERITY RATING SCALE - C-SSRS
2. HAVE YOU ACTUALLY HAD ANY THOUGHTS OF KILLING YOURSELF IN THE PAST MONTH?: NO
6. HAVE YOU EVER DONE ANYTHING, STARTED TO DO ANYTHING, OR PREPARED TO DO ANYTHING TO END YOUR LIFE?: NO
1. IN THE PAST MONTH, HAVE YOU WISHED YOU WERE DEAD OR WISHED YOU COULD GO TO SLEEP AND NOT WAKE UP?: NO

## 2025-05-22 ASSESSMENT — ACTIVITIES OF DAILY LIVING (ADL)
ADLS_ACUITY_SCORE: 41

## 2025-05-22 ASSESSMENT — PAIN SCALES - GENERAL: PAINLEVEL_OUTOF10: MODERATE PAIN (5)

## 2025-05-22 NOTE — NURSING NOTE
"Chief Complaint   Patient presents with    Mono       Initial LMP 04/16/2025 (Approximate)  Estimated body mass index is 28.51 kg/m  as calculated from the following:    Height as of 5/19/25: 1.702 m (5' 7\").    Weight as of 5/19/25: 82.6 kg (182 lb).    Patient presents to the clinic using No DME    Is there anyone who you would like to be able to receive your results? No  If yes have patient fill out [facility]      "

## 2025-05-22 NOTE — TELEPHONE ENCOUNTER
DATE/TIME OF CALL RECEIVED FROM LAB:  05/22/25 at 1:20 PM   LAB TEST:  CMP  LAB VALUE:  ALT: 586  PROVIDER NOTIFIED?: Yes  PROVIDER NAME: Ac   DATE/TIME LAB VALUE REPORTED TO PROVIDER: 5/22/2025 at 1320  MECHANISM OF PROVIDER NOTIFICATION: Forwarding encounter to PCP and secure chat  PROVIDER RESPONSE: Awaiting response    Mando Foote, Clinic RN  [facility]

## 2025-05-22 NOTE — ED PROVIDER NOTES
"[facility]  Emergency Department Visit Note    PATIENT:  Janell Bradley     19 year old     female      [id number]    Chief complaint:  Chief Complaint   Patient presents with    Abnormal Labs     Diagnosed with mono; now abnormal liver levels.         History of present illness:  Patient is a 19 year old female with a medical history significant for migraines, anxiety, allergies, lactose intolerance presenting for evaluation of elevated liver enzymes in the setting of mononucleosis infection.  Patient was seen by her primary care provider on 5- and diagnosed with acute mononucleosis.  Primary symptoms at time were for throat pain and swollen glands.  She also had some vomiting which she attributed to migraines.  She was then seen the following day with complaints for throat pain, given Tylenol ibuprofen and had a neck CT to evaluate the swelling in her anterior cervical region.  No abscess noted.  She is managing her symptoms with Tylenol and ibuprofen.  Came to the emergency department with persistent symptoms on 5- seen by my partner Dr. Orlando Magaña.  At that time she received steroids for her adenopathy.  Her primary care provider rechecked some labs and found the patient to have a significantly elevated ALT above 500.  This concerned the primary care provider considering the significant elevation in this value and recommended that the patient come into the ER for evaluation of any complications related to mononucleosis.Taking tylenol and ibuprofen intermittently and appropriately. Is very fatigued but otherwise seems to be doing ok.     Review of Systems:  As in HPI above    /77   Pulse 90   Temp 98.3  F (36.8  C) (Oral)   Resp 16   Ht 1.676 m (5' 6\")   Wt 83 kg (183 lb)   LMP 04/16/2025 (Approximate)   SpO2 99%   BMI 29.54 kg/m      Physical Exam  Constitutional: laying in hospital bed, alert, oriented, in no apparent distress, conversant, and answering " questions appropriately  HEENT: normocephalic, atraumatic, pupils 3mm, equal, round, and reactive to light, sclerae anicteric, extraocular motions intact, and moist mucous membranes  Neck: able to fully range significant anterior and posterior lymphadenopathy  Cardiovascular: regular rate and rhythm  Pulmonary: breathing comfortably on room air and lungs clear to auscultation bilaterally  Abdominal: soft, non-tender, non-distended and mild discomfort over RUQ and LUQ but no tenderness  Genitourinary: deferred  Extremities/MSK: no peripheral edema and no cyanosis   Skin: warm, dry, non-diaphoretic, no rashes or lesions, and no mottling  Neurologic: moves all four extremities spontaneously and GCS 15  Psychiatric: calm, appropriate, fatigued       MDM:  Patient is a 19 year old female with above history presenting for evaluation of elevated LFTs in the setting of mononucleosis.    Vitals reassuring and within normal limits. Exam reassuring.  Patient has mild right upper quadrant and left upper quadrant tenderness to palpation but otherwise she is well-appearing, does appear fatigued    Differential diagnosis includes but is not limited to Tylenol overdose, fulminant liver failure related to mononucleosis, coagulopathy related to liver failure.  Plan for pregnancy test, basic labs, consult to hepatobiliary.  Also plan for right upper quadrant ultrasound.    I spoke to the provider on hepatobiliary at the [facility].  We discussed patient's elevated liver enzymes including an ALT of 583.  They recommended obtaining hepatitis labs, INR, Tylenol level to ensure the patient does not accidentally over taking Tylenol in the setting of her infection.  They also recommend Doppler of her liver to evaluate for flowing hepatic pain.    Ultrasound both of right upper quadrant blood supply and read for quadrant are reassuring and within normal limits, UA is reassuring, Tylenol levels negative.  Ultimately patient is okay to discharge  and follow-up with these labs tomorrow.  I ordered outpatient labs and encouraged her to follow-up with her primary care provider.    I reviewed the workup with the patient and answered their questions. Follow up instructions and return precautions discussed with patient and listed in discharge paperwork. Patient discharged in stable condition.     Remainder of ED course below.    ED COURSE:  ED Course as of 05/22/25 2103   Thu May 22, 2025   1605 HCG Qual Urine: Negative   1819 Acetaminophen(!): <5.0   1819 INR: 1.00   2103 US Abdominal Doppler Complete  IMPRESSION:  1.  Patent hepatic vasculature with proper directional flow.     2103 US Abdomen Limited  IMPRESSION:  1.  Mild splenomegaly.  2.  Otherwise unremarkable limited abdominal ultrasound.         Encounter Diagnoses:  Final diagnoses:   Elevated liver enzymes       Final disposition: discharge    Radha Pederson DO  EM Physician  [facility] ED       Radha Pederson DO  05/22/25 2104

## 2025-05-22 NOTE — TELEPHONE ENCOUNTER
Provider reviewed and advised patient be evaluated in the ED  Patient currently at the ED     Mando Foote, Clinic RN  [facility]

## 2025-05-22 NOTE — DISCHARGE INSTRUCTIONS
Repeat labs in 1-2 days.  I ordered labs for you to have done in an outpatient setting.    Please follow-up with your primary care provider about these labs.  If you feel any worsening or changing symptoms please come back and be reevaluated.

## 2025-05-22 NOTE — ED TRIAGE NOTES
Diagnosed with mono; now abnormal liver levels.      Triage Assessment (Adult)       Row Name 05/22/25 144          Triage Assessment    Airway WDL WDL        Cardiac WDL    Cardiac WDL WDL        Cognitive/Neuro/Behavioral WDL    Cognitive/Neuro/Behavioral WDL WDL

## 2025-05-23 ENCOUNTER — HOSPITAL ENCOUNTER (EMERGENCY)
Facility: CLINIC | Age: 20
Discharge: HOME | End: 2025-05-24
Attending: EMERGENCY MEDICINE | Admitting: EMERGENCY MEDICINE
Payer: COMMERCIAL

## 2025-05-23 VITALS
SYSTOLIC BLOOD PRESSURE: 147 MMHG | TEMPERATURE: 98.7 F | RESPIRATION RATE: 19 BRPM | DIASTOLIC BLOOD PRESSURE: 84 MMHG | OXYGEN SATURATION: 100 % | HEART RATE: 75 BPM

## 2025-05-23 DIAGNOSIS — R79.89 ELEVATED LFTS: ICD-10-CM

## 2025-05-23 DIAGNOSIS — B27.89 OTHER INFECTIOUS MONONUCLEOSIS WITH OTHER COMPLICATION: ICD-10-CM

## 2025-05-23 LAB
ALBUMIN SERPL BCG-MCNC: 4.4 G/DL (ref 3.5–5.2)
ALP SERPL-CCNC: 315 U/L (ref 40–150)
ALT SERPL W P-5'-P-CCNC: 586 U/L (ref 0–50)
ANION GAP SERPL CALCULATED.3IONS-SCNC: 13 MMOL/L (ref 7–15)
AST SERPL W P-5'-P-CCNC: 173 U/L (ref 0–35)
BILIRUB SERPL-MCNC: 0.6 MG/DL
BUN SERPL-MCNC: 13.9 MG/DL (ref 6–20)
CALCIUM SERPL-MCNC: 9.3 MG/DL (ref 8.8–10.4)
CHLORIDE SERPL-SCNC: 103 MMOL/L (ref 98–107)
CREAT SERPL-MCNC: 0.82 MG/DL (ref 0.51–0.95)
EGFRCR SERPLBLD CKD-EPI 2021: >90 ML/MIN/1.73M2
GLUCOSE SERPL-MCNC: 163 MG/DL (ref 70–99)
HBV CORE IGM SERPL QL IA: NONREACTIVE
HBV SURFACE AB SERPL IA-ACNC: 4.98 M[IU]/ML
HBV SURFACE AB SERPL IA-ACNC: NONREACTIVE M[IU]/ML
HBV SURFACE AG SERPL QL IA: NONREACTIVE
HCO3 SERPL-SCNC: 23 MMOL/L (ref 22–29)
INR PPP: 1.03 (ref 0.85–1.15)
POTASSIUM SERPL-SCNC: 4.2 MMOL/L (ref 3.4–5.3)
PROT SERPL-MCNC: 7.8 G/DL (ref 6.4–8.3)
PROTHROMBIN TIME: 13.4 SECONDS (ref 11.8–14.8)
SODIUM SERPL-SCNC: 139 MMOL/L (ref 135–145)

## 2025-05-23 PROCEDURE — 36415 COLL VENOUS BLD VENIPUNCTURE: CPT | Performed by: EMERGENCY MEDICINE

## 2025-05-23 PROCEDURE — 258N000003 HC RX IP 258 OP 636: Performed by: EMERGENCY MEDICINE

## 2025-05-23 PROCEDURE — 96374 THER/PROPH/DIAG INJ IV PUSH: CPT | Performed by: EMERGENCY MEDICINE

## 2025-05-23 PROCEDURE — 96361 HYDRATE IV INFUSION ADD-ON: CPT | Performed by: EMERGENCY MEDICINE

## 2025-05-23 PROCEDURE — 82247 BILIRUBIN TOTAL: CPT | Performed by: EMERGENCY MEDICINE

## 2025-05-23 PROCEDURE — 85610 PROTHROMBIN TIME: CPT | Performed by: EMERGENCY MEDICINE

## 2025-05-23 PROCEDURE — 82977 ASSAY OF GGT: CPT | Performed by: EMERGENCY MEDICINE

## 2025-05-23 PROCEDURE — 250N000011 HC RX IP 250 OP 636: Performed by: EMERGENCY MEDICINE

## 2025-05-23 PROCEDURE — 99284 EMERGENCY DEPT VISIT MOD MDM: CPT | Performed by: EMERGENCY MEDICINE

## 2025-05-23 PROCEDURE — 99284 EMERGENCY DEPT VISIT MOD MDM: CPT | Mod: 25 | Performed by: EMERGENCY MEDICINE

## 2025-05-23 RX ORDER — ONDANSETRON 2 MG/ML
4 INJECTION INTRAMUSCULAR; INTRAVENOUS ONCE
Status: COMPLETED | OUTPATIENT
Start: 2025-05-23 | End: 2025-05-23

## 2025-05-23 RX ADMIN — ONDANSETRON 4 MG: 2 INJECTION, SOLUTION INTRAMUSCULAR; INTRAVENOUS at 23:47

## 2025-05-23 RX ADMIN — SODIUM CHLORIDE 1000 ML: 0.9 INJECTION, SOLUTION INTRAVENOUS at 22:49

## 2025-05-23 ASSESSMENT — ACTIVITIES OF DAILY LIVING (ADL): ADLS_ACUITY_SCORE: 41

## 2025-05-24 ENCOUNTER — RESULTS FOLLOW-UP (OUTPATIENT)
Dept: NURSING | Facility: CLINIC | Age: 20
End: 2025-05-24
Payer: COMMERCIAL

## 2025-05-24 LAB — GGT SERPL-CCNC: 210 U/L (ref 5–36)

## 2025-05-24 RX ORDER — ONDANSETRON 4 MG/1
4 TABLET, ORALLY DISINTEGRATING ORAL EVERY 8 HOURS PRN
Qty: 10 TABLET | Refills: 0 | Status: SHIPPED | OUTPATIENT
Start: 2025-05-24

## 2025-05-24 NOTE — ED PROVIDER NOTES
History     Chief Complaint   Patient presents with    Abnormal Labs     HPI  Janell Bradley is a 19 year old female with a medical history significant for migraines, anxiety, allergies, lactose intolerance presenting for evaluation of elevated liver enzymes in the setting of mononucleosis infection.  Patient was seen by her primary care provider on 5- and diagnosed with acute mononucleosis.  Primary symptoms at time were for throat pain and swollen glands.  She also had some vomiting which she attributed to migraines.  She was then seen the following day with complaints for throat pain, given Tylenol ibuprofen and had a neck CT to evaluate the swelling in her anterior cervical region.  No abscess noted.  She is managing her symptoms with Tylenol and ibuprofen.  Came to the emergency department with persistent symptoms on 5- seen by my partner Dr. Orlando Magaña.  At that time she received steroids for her adenopathy.  Her primary care provider rechecked some labs and found the patient to have a significantly elevated ALT above 500.  This concerned the primary care provider considering the significant elevation in this value and recommended that the patient come into the ER for evaluation of any complications related to mononucleosis.Taking tylenol and ibuprofen intermittently and appropriately.     Yesterday, patient was seen in the emergency department for evaluation of continued elevation of her liver enzymes.  The ER provider spoke with hepatobiliary at the [facility].  They recommended hepatitis labs, Tylenol level, and Doppler of the liver.  Doppler of the liver showed appropriate blood supply, and gallbladder ultrasound showed no evidence of cholecystitis.  Her hepatic labs were stable.  So far, hepatitis serologies have been reassuring.    Patient had labs repeated today which showed an alk phos of 291, AST of 244, and ALT of 617.  Yesterday, her alk phos was 313 with an AST of 255  and an ALT of 583.  The on-call primary care physician wanted her reevaluated.    Patient continues to have fatigue with some nausea.  She has unchanged abdominal discomfort.  She is no longer having fevers.        Allergies:  Allergies   Allergen Reactions    Other Drug Allergy (See Comments) Anaphylaxis     hay    Pseudoephedrine Hives    Milk (Cow) Rash    Tilactase Rash       Problem List:    There are no active problems to display for this patient.       Past Medical History:    No past medical history on file.    Past Surgical History:    No past surgical history on file.    Family History:    No family history on file.    Social History:  Marital Status:  Single [1]  Social History     Tobacco Use    Smoking status: Never    Smokeless tobacco: Never   Vaping Use    Vaping status: Every Day    Substances: Nicotine    Devices: Disposable        Medications:    ondansetron (ZOFRAN ODT) 4 MG ODT tab  cephALEXin (KEFLEX) 500 MG capsule  gabapentin (NEURONTIN) 100 MG capsule  predniSONE (DELTASONE) 20 MG tablet  SUMAtriptan (IMITREX) 50 MG tablet  traZODone (DESYREL) 50 MG tablet  venlafaxine (EFFEXOR XR) 75 MG 24 hr capsule          Review of Systems   Constitutional:  Positive for fatigue. Negative for chills and fever.   HENT:  Negative for congestion and sore throat.    Eyes:  Negative for discharge.   Respiratory:  Negative for cough and shortness of breath.    Cardiovascular:  Negative for chest pain and leg swelling.   Gastrointestinal:  Positive for abdominal pain and nausea. Negative for blood in stool, diarrhea and vomiting.   Genitourinary:  Negative for decreased urine volume, dysuria and frequency.   Musculoskeletal:  Negative for myalgias.   Skin:  Negative for color change and rash.   Neurological:  Negative for weakness, light-headedness and headaches.   Hematological:  Negative for adenopathy.   Psychiatric/Behavioral:  Negative for agitation, behavioral problems and confusion.        Physical Exam    BP: (!) 147/84  Pulse: 75  Temp: 98.7  F (37.1  C)  Resp: 19  SpO2: 100 %      Physical Exam  Constitutional:       General: She is not in acute distress.     Appearance: She is well-developed.   HENT:      Head: Normocephalic and atraumatic.   Eyes:      Conjunctiva/sclera: Conjunctivae normal.      Pupils: Pupils are equal, round, and reactive to light.   Neck:      Thyroid: No thyromegaly.      Trachea: No tracheal deviation.   Cardiovascular:      Rate and Rhythm: Normal rate and regular rhythm.      Heart sounds: Normal heart sounds. No murmur heard.  Pulmonary:      Effort: Pulmonary effort is normal. No respiratory distress.      Breath sounds: Normal breath sounds. No wheezing.   Chest:      Chest wall: No tenderness.   Abdominal:      General: There is no distension.      Palpations: Abdomen is soft.      Tenderness: There is abdominal tenderness (mild epigastric without rebound or guarding).   Musculoskeletal:         General: No tenderness.      Cervical back: Normal range of motion and neck supple.   Skin:     General: Skin is warm.      Findings: No rash.   Neurological:      Mental Status: She is alert and oriented to person, place, and time.      Sensory: No sensory deficit.   Psychiatric:         Behavior: Behavior normal.         ED Course        Procedures                No results found for this or any previous visit (from the past 24 hours).  Results for orders placed or performed during the hospital encounter of 05/23/25 (from the past 48 hours)   Comprehensive metabolic panel   Result Value Ref Range    Sodium 139 135 - 145 mmol/L    Potassium 4.2 3.4 - 5.3 mmol/L    Carbon Dioxide (CO2) 23 22 - 29 mmol/L    Anion Gap 13 7 - 15 mmol/L    Urea Nitrogen 13.9 6.0 - 20.0 mg/dL    Creatinine 0.82 0.51 - 0.95 mg/dL    GFR Estimate >90 >60 mL/min/1.73m2    Calcium 9.3 8.8 - 10.4 mg/dL    Chloride 103 98 - 107 mmol/L    Glucose 163 (H) 70 - 99 mg/dL    Alkaline Phosphatase 315 (H) 40 - 150 U/L    AST  173 (H) 0 - 35 U/L     (HH) 0 - 50 U/L    Protein Total 7.8 6.4 - 8.3 g/dL    Albumin 4.4 3.5 - 5.2 g/dL    Bilirubin Total 0.6 <=1.2 mg/dL   GGT   Result Value Ref Range     (H) 5 - 36 U/L   INR   Result Value Ref Range    INR 1.03 0.85 - 1.15    PT 13.4 11.8 - 14.8 Seconds        Medications   sodium chloride 0.9% BOLUS 1,000 mL (0 mLs Intravenous Stopped 5/24/25 0003)   ondansetron (ZOFRAN) injection 4 mg (4 mg Intravenous $Given 5/23/25 3276)       Assessments & Plan (with Medical Decision Making)     I have reviewed the nursing notes.    I have reviewed the findings, diagnosis, plan and need for follow up with the patient.    19 year old female with a medical history significant for migraines, anxiety, allergies, lactose intolerance presenting for evaluation of elevated liver enzymes in the setting of mononucleosis infection.  Patient was seen by her primary care provider on 5- and diagnosed with acute mononucleosis.  Primary symptoms at time were for throat pain and swollen glands.  She also had some vomiting which she attributed to migraines.  She was then seen the following day with complaints for throat pain, given Tylenol ibuprofen and had a neck CT to evaluate the swelling in her anterior cervical region.  No abscess noted.  She is managing her symptoms with Tylenol and ibuprofen.  Came to the emergency department with persistent symptoms on 5- seen by my partner Dr. Orlando Magaña.  At that time she received steroids for her adenopathy.  Her primary care provider rechecked some labs and found the patient to have a significantly elevated ALT above 500.  This concerned the primary care provider considering the significant elevation in this value and recommended that the patient come into the ER for evaluation of any complications related to mononucleosis.Taking tylenol and ibuprofen intermittently and appropriately.     Yesterday, patient was seen in the emergency department for  evaluation of continued elevation of her liver enzymes.  The ER provider spoke with hepatobiliary at the [facility].  They recommended hepatitis labs, Tylenol level, and Doppler of the liver.  Doppler of the liver showed appropriate blood supply, and gallbladder ultrasound showed no evidence of cholecystitis.  Her hepatic labs were stable.  So far, hepatitis serologies have been reassuring.    Patient had labs repeated today which showed an alk phos of 291, AST of 244, and ALT of 617.  Yesterday, her alk phos was 313 with an AST of 255 and an ALT of 583.  The on-call primary care physician wanted her reevaluated.    Patient continues to have fatigue with some nausea.  She has unchanged abdominal discomfort.  She is no longer having fevers.    On exam, patient's blood pressure is 147/84 with a temperature of 98.7 and pulse rate of 75.  Respirations are 19 with O2 saturations of 100%.  She is not jaundiced.  She has mild epigastric pain without rebound or guarding.    Blood work was rechecked which showed an alk phos of 315, AST of 173, and ALT of 586.  Her GGT was 210.  These labs are consistent with past liver enzyme elevations.    I discussed the case with hepatology at the [facility].  She recommended checking coags.  This was done and her coags are normal.  I feel her symptoms are most likely related to her mononucleosis and over time her liver enzymes should improve.  We are still awaiting a few serologies.  Patient does not seem like she is getting worse, she is just not getting better as quickly as we would like.  She was given IV fluids and Zofran for symptom management.  Patient is tolerating an oral intake.  I do not believe that she requires hospitalization and we need to continue to manage her symptoms.  We will recheck her liver enzymes on Tuesday after the Memorial Day holiday.  She will return should she develop worsening pain, fevers, jaundice, or vomiting.      Medical  Decision Making  The patient's presentation was of moderate complexity (an undiagnosed new problem with uncertain prognosis).    The patient's evaluation involved:  review of 3+ test result(s) ordered prior to this encounter (see separate area of note for details)  ordering and/or review of 3+ test(s) in this encounter (see separate area of note for details)  discussion of management or test interpretation with another health professional (see separate area of note for details)    The patient's management necessitated high risk (a decision regarding hospitalization).        Discharge Medication List as of 5/24/2025 12:09 AM        START taking these medications    Details   ondansetron (ZOFRAN ODT) 4 MG ODT tab Take 1 tablet (4 mg) by mouth every 8 hours as needed for nausea., Disp-10 tablet, R-0, [employer]             Final diagnoses:   Elevated LFTs   Other infectious mononucleosis with other complication       5/23/2025   [facility] EMERGENCY DEPT       Barney Jay MD  05/25/25 6752

## 2025-05-24 NOTE — DISCHARGE INSTRUCTIONS
Push fluids  Zofran for nausea  Recheck liver enzymes next week  Return if increased pain, fevers, jaundice.

## 2025-05-24 NOTE — ED TRIAGE NOTES
Primary physician called patient saying her labs are off and needs to get checked in for these. Patient assumes it is for her liver labs.

## 2025-05-25 ASSESSMENT — ENCOUNTER SYMPTOMS
CONFUSION: 0
VOMITING: 0
ABDOMINAL PAIN: 1
BLOOD IN STOOL: 0
MYALGIAS: 0
DYSURIA: 0
COLOR CHANGE: 0
FREQUENCY: 0
DIARRHEA: 0
ADENOPATHY: 0
COUGH: 0
FATIGUE: 1
SORE THROAT: 0
AGITATION: 0
CHILLS: 0
LIGHT-HEADEDNESS: 0
SHORTNESS OF BREATH: 0
NAUSEA: 1
HEADACHES: 0
FEVER: 0
EYE DISCHARGE: 0
WEAKNESS: 0

## 2025-05-27 ENCOUNTER — OFFICE VISIT (OUTPATIENT)
Dept: FAMILY MEDICINE | Facility: CLINIC | Age: 20
End: 2025-05-27
Payer: COMMERCIAL

## 2025-05-27 VITALS
BODY MASS INDEX: 29.41 KG/M2 | SYSTOLIC BLOOD PRESSURE: 118 MMHG | TEMPERATURE: 97.9 F | HEIGHT: 66 IN | RESPIRATION RATE: 20 BRPM | HEART RATE: 89 BPM | DIASTOLIC BLOOD PRESSURE: 68 MMHG | OXYGEN SATURATION: 98 % | WEIGHT: 183 LBS

## 2025-05-27 DIAGNOSIS — R74.8 ELEVATED LIVER ENZYMES: ICD-10-CM

## 2025-05-27 DIAGNOSIS — R79.89 ELEVATED LFTS: ICD-10-CM

## 2025-05-27 DIAGNOSIS — B27.99 INFECTIOUS MONONUCLEOSIS, WITH OTHER COMPLICATION, INFECTIOUS MONONUCLEOSIS DUE TO UNSPECIFIED ORGANISM: Primary | ICD-10-CM

## 2025-05-27 LAB
ALBUMIN SERPL BCG-MCNC: 4.4 G/DL (ref 3.5–5.2)
ALP SERPL-CCNC: 197 U/L (ref 40–150)
ALT SERPL W P-5'-P-CCNC: 259 U/L (ref 0–50)
ANION GAP SERPL CALCULATED.3IONS-SCNC: 11 MMOL/L (ref 7–15)
AST SERPL W P-5'-P-CCNC: 37 U/L (ref 0–35)
BASOPHILS # BLD AUTO: 0 10E3/UL (ref 0–0.2)
BASOPHILS NFR BLD AUTO: 0 %
BILIRUB SERPL-MCNC: 0.6 MG/DL
BUN SERPL-MCNC: 16.8 MG/DL (ref 6–20)
CALCIUM SERPL-MCNC: 9.6 MG/DL (ref 8.8–10.4)
CHLORIDE SERPL-SCNC: 105 MMOL/L (ref 98–107)
CREAT SERPL-MCNC: 0.97 MG/DL (ref 0.51–0.95)
EGFRCR SERPLBLD CKD-EPI 2021: 86 ML/MIN/1.73M2
EOSINOPHIL # BLD AUTO: 0 10E3/UL (ref 0–0.7)
EOSINOPHIL NFR BLD AUTO: 0 %
ERYTHROCYTE [DISTWIDTH] IN BLOOD BY AUTOMATED COUNT: 14.3 % (ref 10–15)
GLUCOSE SERPL-MCNC: 90 MG/DL (ref 70–99)
HCO3 SERPL-SCNC: 25 MMOL/L (ref 22–29)
HCT VFR BLD AUTO: 39.2 % (ref 35–47)
HGB BLD-MCNC: 12.6 G/DL (ref 11.7–15.7)
IMM GRANULOCYTES # BLD: 0.1 10E3/UL
IMM GRANULOCYTES NFR BLD: 1 %
LYMPHOCYTES # BLD AUTO: 3.6 10E3/UL (ref 0.8–5.3)
LYMPHOCYTES NFR BLD AUTO: 40 %
MCH RBC QN AUTO: 27.9 PG (ref 26.5–33)
MCHC RBC AUTO-ENTMCNC: 32.1 G/DL (ref 31.5–36.5)
MCV RBC AUTO: 87 FL (ref 78–100)
MONOCYTES # BLD AUTO: 0.7 10E3/UL (ref 0–1.3)
MONOCYTES NFR BLD AUTO: 8 %
NEUTROPHILS # BLD AUTO: 4.4 10E3/UL (ref 1.6–8.3)
NEUTROPHILS NFR BLD AUTO: 50 %
PLATELET # BLD AUTO: 362 10E3/UL (ref 150–450)
POTASSIUM SERPL-SCNC: 4 MMOL/L (ref 3.4–5.3)
PROT SERPL-MCNC: 7.4 G/DL (ref 6.4–8.3)
RBC # BLD AUTO: 4.51 10E6/UL (ref 3.8–5.2)
SODIUM SERPL-SCNC: 141 MMOL/L (ref 135–145)
WBC # BLD AUTO: 8.8 10E3/UL (ref 4–11)

## 2025-05-27 PROCEDURE — 80053 COMPREHEN METABOLIC PANEL: CPT | Performed by: NURSE PRACTITIONER

## 2025-05-27 PROCEDURE — 1125F AMNT PAIN NOTED PAIN PRSNT: CPT | Performed by: NURSE PRACTITIONER

## 2025-05-27 PROCEDURE — 36415 COLL VENOUS BLD VENIPUNCTURE: CPT | Performed by: NURSE PRACTITIONER

## 2025-05-27 PROCEDURE — 3078F DIAST BP <80 MM HG: CPT | Performed by: NURSE PRACTITIONER

## 2025-05-27 PROCEDURE — 85025 COMPLETE CBC W/AUTO DIFF WBC: CPT | Performed by: NURSE PRACTITIONER

## 2025-05-27 PROCEDURE — 99213 OFFICE O/P EST LOW 20 MIN: CPT | Performed by: NURSE PRACTITIONER

## 2025-05-27 PROCEDURE — 3074F SYST BP LT 130 MM HG: CPT | Performed by: NURSE PRACTITIONER

## 2025-05-27 ASSESSMENT — PAIN SCALES - GENERAL: PAINLEVEL_OUTOF10: MODERATE PAIN (4)

## 2025-05-27 NOTE — PROGRESS NOTES
"  Assessment & Plan     Infectious mononucleosis, with other complication, infectious mononucleosis due to unspecified organism  Liver enzymes are improving patient still continues to be tired will have her off until 6/2/2025  - Comprehensive metabolic panel (BMP + Alb, Alk Phos, ALT, AST, Total. Bili, TP); Future  - CBC with platelets; Future  - Comprehensive metabolic panel (BMP + Alb, Alk Phos, ALT, AST, Total. Bili, TP)    Elevated liver enzymes  - Comprehensive metabolic panel (BMP + Alb, Alk Phos, ALT, AST, Total. Bili, TP); Future  - CBC with platelets; Future  - Comprehensive metabolic panel (BMP + Alb, Alk Phos, ALT, AST, Total. Bili, TP)    Elevated LFTs  - CBC with Platelets & Differential        MED REC REQUIRED  Post Medication Reconciliation Status:  Discharge medications reconciled, continue medications without change    Follow-up       Peyman Maciel is a 19 year old, presenting for the following health issues:  ER F/U        5/27/2025     8:37 AM   Additional Questions   Roomed by Kaylie GREEN CMA     [facility]        ED/UC Followup:    Facility:  [facility] ED  Date of visit: 05/19/2025, 05/22/2025, 05/23/2025  Reason for visit: Elevated LFTs, mononucleosis  Current Status: Still really tired and weak.  States the swelling in her neck has gone down tremendously.  Still having a bit of abdominal pain.         Review of Systems  Constitutional, HEENT, cardiovascular, pulmonary, gi and gu systems are negative, except as otherwise noted.      Objective    /68 (BP Location: Right arm, Patient Position: Sitting, Cuff Size: Adult Regular)   Pulse 89   Temp 97.9  F (36.6  C) (Tympanic)   Resp 20   Ht 1.676 m (5' 6\")   Wt 83 kg (183 lb)   LMP 04/16/2025 (Approximate)   SpO2 98%   BMI 29.54 kg/m    Body mass index is 29.54 kg/m .  Physical Exam   GENERAL: alert and no distress  EYES: Eyes grossly normal to inspection, PERRL and conjunctivae and sclerae normal  HENT: ear canals and " TM's normal, nose and mouth without ulcers or lesions  NECK: no adenopathy, no asymmetry, masses, or scars  RESP: lungs clear to auscultation - no rales, rhonchi or wheezes  CV: regular rate and rhythm, normal S1 S2, no S3 or S4, no murmur, click or rub, no peripheral edema  ABDOMEN: soft, nontender, no hepatosplenomegaly, no masses and bowel sounds normal  MS: no gross musculoskeletal defects noted, no edema  SKIN: no suspicious lesions or rashes  NEURO: Normal strength and tone, mentation intact and speech normal  PSYCH: mentation appears normal, affect normal/bright    Results for orders placed or performed in visit on 05/27/25   Comprehensive metabolic panel (BMP + Alb, Alk Phos, ALT, AST, Total. Bili, TP)     Status: Abnormal   Result Value Ref Range    Sodium 141 135 - 145 mmol/L    Potassium 4.0 3.4 - 5.3 mmol/L    Carbon Dioxide (CO2) 25 22 - 29 mmol/L    Anion Gap 11 7 - 15 mmol/L    Urea Nitrogen 16.8 6.0 - 20.0 mg/dL    Creatinine 0.97 (H) 0.51 - 0.95 mg/dL    GFR Estimate 86 >60 mL/min/1.73m2    Calcium 9.6 8.8 - 10.4 mg/dL    Chloride 105 98 - 107 mmol/L    Glucose 90 70 - 99 mg/dL    Alkaline Phosphatase 197 (H) 40 - 150 U/L    AST 37 (H) 0 - 35 U/L     (H) 0 - 50 U/L    Protein Total 7.4 6.4 - 8.3 g/dL    Albumin 4.4 3.5 - 5.2 g/dL    Bilirubin Total 0.6 <=1.2 mg/dL   CBC with platelets and differential     Status: None   Result Value Ref Range    WBC Count 8.8 4.0 - 11.0 10e3/uL    RBC Count 4.51 3.80 - 5.20 10e6/uL    Hemoglobin 12.6 11.7 - 15.7 g/dL    Hematocrit 39.2 35.0 - 47.0 %    MCV 87 78 - 100 fL    MCH 27.9 26.5 - 33.0 pg    MCHC 32.1 31.5 - 36.5 g/dL    RDW 14.3 10.0 - 15.0 %    Platelet Count 362 150 - 450 10e3/uL    % Neutrophils 50 %    % Lymphocytes 40 %    % Monocytes 8 %    % Eosinophils 0 %    % Basophils 0 %    % Immature Granulocytes 1 %    Absolute Neutrophils 4.4 1.6 - 8.3 10e3/uL    Absolute Lymphocytes 3.6 0.8 - 5.3 10e3/uL    Absolute Monocytes 0.7 0.0 - 1.3 10e3/uL     Absolute Eosinophils 0.0 0.0 - 0.7 10e3/uL    Absolute Basophils 0.0 0.0 - 0.2 10e3/uL    Absolute Immature Granulocytes 0.1 <=0.4 10e3/uL   CBC with Platelets & Differential     Status: None    Narrative    The following orders were created for panel order CBC with Platelets & Differential.  Procedure                               Abnormality         Status                     ---------                               -----------         ------                     CBC with platelets and ...[[id number]]                      Final result                 Please view results for these tests on the individual orders.           Signed Electronically by: TAYLOR Holley CNP

## 2025-05-27 NOTE — LETTER
May 27, 2025      Janell Bradley  [address] [facility] [address]        To Whom It May Concern:    Janell Bradley  was seen on 5/27/2025.  Please excuse her  until 6/2/2025 due to illness.        Sincerely,        TAYLOR Holley CNP    Electronically signed

## 2025-05-30 ENCOUNTER — RESULTS FOLLOW-UP (OUTPATIENT)
Dept: FAMILY MEDICINE | Facility: CLINIC | Age: 20
End: 2025-05-30

## 2025-06-09 ENCOUNTER — TELEPHONE (OUTPATIENT)
Dept: FAMILY MEDICINE | Facility: CLINIC | Age: 20
End: 2025-06-09
Payer: COMMERCIAL

## 2025-06-09 NOTE — TELEPHONE ENCOUNTER
Called and spoke with pt. Informed of Fátima's message. Pt stated she is feeling better right now, has not eaten much the past couple of days and thinks that might be why she passed out today. Pt does not think she needs an appt at this time, will call back if anything changes or symptoms return/get worse.    Forwarding to Fátima as SANYA.    Nafisa Gunter Patient    
I recommend patient make an appointment for further evaluation and we can determine workability.  Can schedule in my same-day appointments tomorrow      Fátima Tam CNP     
Received call from Patient.  Had diagnosis of mono a couple of weeks ago.  Was working 1/2 days last week and that went fine.  Went to work today and patient passed out.  Her manager caught her.  No injuries.  Ambulance was called and they released patient to mom's care.  States that liver enzymes have been high, but coming down.  Patient wonders if there is anything else that she should be doing.  Has been resting a lot and keeping hydrated.  Patient would like a note also stating that she should work 1/2 days until feeling better.  Would like provider advise.  Robles HAMPTON, Clinic RN   [facility]     
Fall with Harm Risk

## 2025-07-01 ENCOUNTER — MYC MEDICAL ADVICE (OUTPATIENT)
Dept: FAMILY MEDICINE | Facility: CLINIC | Age: 20
End: 2025-07-01

## 2025-07-01 DIAGNOSIS — Z30.017 NEXPLANON INSERTION: Primary | ICD-10-CM

## 2025-07-01 RX ORDER — LEVONORGESTREL/ETHIN.ESTRADIOL 0.1-0.02MG
1 TABLET ORAL DAILY
Qty: 56 TABLET | Refills: 0 | Status: SHIPPED | OUTPATIENT
Start: 2025-07-01